# Patient Record
Sex: MALE | Race: WHITE | NOT HISPANIC OR LATINO | Employment: FULL TIME | URBAN - METROPOLITAN AREA
[De-identification: names, ages, dates, MRNs, and addresses within clinical notes are randomized per-mention and may not be internally consistent; named-entity substitution may affect disease eponyms.]

---

## 2017-04-11 ENCOUNTER — ALLSCRIPTS OFFICE VISIT (OUTPATIENT)
Dept: OTHER | Facility: OTHER | Age: 47
End: 2017-04-11

## 2017-10-07 ENCOUNTER — GENERIC CONVERSION - ENCOUNTER (OUTPATIENT)
Dept: OTHER | Facility: OTHER | Age: 47
End: 2017-10-07

## 2017-10-08 ENCOUNTER — GENERIC CONVERSION - ENCOUNTER (OUTPATIENT)
Dept: OTHER | Facility: OTHER | Age: 47
End: 2017-10-08

## 2017-10-08 LAB
A/G RATIO (HISTORICAL): 1.8 (ref 1.2–2.2)
ALBUMIN SERPL BCP-MCNC: 4.4 G/DL (ref 3.5–5.5)
ALP SERPL-CCNC: 37 IU/L (ref 39–117)
ALT SERPL W P-5'-P-CCNC: 26 IU/L (ref 0–44)
AST SERPL W P-5'-P-CCNC: 35 IU/L (ref 0–40)
BILIRUB SERPL-MCNC: 0.6 MG/DL (ref 0–1.2)
BUN SERPL-MCNC: 13 MG/DL (ref 6–24)
BUN/CREA RATIO (HISTORICAL): 14 (ref 9–20)
CALCIUM SERPL-MCNC: 9.4 MG/DL (ref 8.7–10.2)
CHLORIDE SERPL-SCNC: 99 MMOL/L (ref 96–106)
CHOLEST SERPL-MCNC: 196 MG/DL (ref 100–199)
CO2 SERPL-SCNC: 25 MMOL/L (ref 18–29)
CREAT SERPL-MCNC: 0.96 MG/DL (ref 0.76–1.27)
EGFR AFRICAN AMERICAN (HISTORICAL): 110 ML/MIN/1.73
EGFR-AMERICAN CALC (HISTORICAL): 95 ML/MIN/1.73
GLUCOSE SERPL-MCNC: 95 MG/DL (ref 65–99)
HDLC SERPL-MCNC: 36 MG/DL
INTERPRETATION (HISTORICAL): NORMAL
LDLC SERPL CALC-MCNC: 147 MG/DL (ref 0–99)
POTASSIUM SERPL-SCNC: 4.2 MMOL/L (ref 3.5–5.2)
PROSTATE SPECIFIC ANTIGEN (HISTORICAL): 1.2 NG/ML (ref 0–4)
SODIUM SERPL-SCNC: 141 MMOL/L (ref 134–144)
TOT. GLOBULIN, SERUM (HISTORICAL): 2.4 G/DL (ref 1.5–4.5)
TOTAL PROTEIN (HISTORICAL): 6.8 G/DL (ref 6–8.5)
TRIGL SERPL-MCNC: 63 MG/DL (ref 0–149)

## 2017-10-10 ENCOUNTER — ALLSCRIPTS OFFICE VISIT (OUTPATIENT)
Dept: OTHER | Facility: OTHER | Age: 47
End: 2017-10-10

## 2018-01-11 NOTE — RESULT NOTES
Verified Results  (1) COMPREHENSIVE METABOLIC PANEL [id number] 78:04FP Shona Ferrera     Test Name Result Flag Reference   Glucose, Serum 86 mg/dL  65-99   BUN 20 mg/dL  6-24   Creatinine, Serum 0 82 mg/dL  0 76-1 27   eGFR If NonAfricn Am 108 mL/min/1 73  >59   eGFR If Africn Am 124 mL/min/1 73  >59   BUN/Creatinine Ratio 24 H 9-20   Sodium, Serum 141 mmol/L  134-144   Potassium, Serum 4 3 mmol/L  3 5-5 2   Chloride, Serum 100 mmol/L     Carbon Dioxide, Total 22 mmol/L  18-29   Calcium, Serum 9 6 mg/dL  8 7-10 2   Protein, Total, Serum 7 0 g/dL  6 0-8 5   Albumin, Serum 4 8 g/dL  3 5-5 5   Globulin, Total 2 2 g/dL  1 5-4 5   A/G Ratio 2 2  1 1-2 5   Bilirubin, Total 0 3 mg/dL  0 0-1 2   Alkaline Phosphatase, S 45 IU/L     AST (SGOT) 30 IU/L  0-40   ALT (SGPT) 28 IU/L  0-44     (1) LIPID PANEL FASTING W DIRECT LDL REFLEX 09Apr2016 07:22AM Shona Ferrera     Test Name Result Flag Reference   Cholesterol, Total 241 mg/dL H 100-199   Triglycerides 56 mg/dL  0-149   HDL Cholesterol 42 mg/dL  >39   According to ATP-III Guidelines, HDL-C >59 mg/dL is considered a  negative risk factor for CHD  LDL Cholesterol Calc 188 mg/dL H 0-99     (1) TESTOSTERONE 09Apr2016 07:22AM Shona Ferrera     Test Name Result Flag Reference   Testosterone, Serum 381 ng/dL  [phone]   Comment: Comment     Adult male reference interval is based on a population of lean males  up to 36years old  [facility]) Cardiovascular Risk Assessment 09Apr2016 07:22AM Shona Ferrera     Test Name Result Flag Reference   Interpretation Note     Supplement report is available  PDF Image   (1) TSH 09Apr2016 07:22AM Sohna Ferrera     Test Name Result Flag Reference   TSH 0 971 uIU/mL  0 450-4 500       Discussion/Summary   Sugar, kidneys, minerals and liver are normal    LDL bad cholesterol is high  Goal is <130  Medications should be considered if you are willing     Testosterone is normal    Thyroid function is normal     Ramona [place]

## 2018-01-13 VITALS
TEMPERATURE: 98.1 F | BODY MASS INDEX: 28.28 KG/M2 | DIASTOLIC BLOOD PRESSURE: 70 MMHG | RESPIRATION RATE: 16 BRPM | HEART RATE: 92 BPM | HEIGHT: 66 IN | WEIGHT: 176 LBS | SYSTOLIC BLOOD PRESSURE: 138 MMHG

## 2018-01-15 NOTE — PROGRESS NOTES
Assessment    1  Benign essential hypertension (401 1) (I10)   2  History of nicotine dependence (V15 82) (Z87 891)   3  Obesity (278 00) (E66 9)   4  [place] cardiac risk >20% in next 10 years (V49 89) (Z91 89)   5  Encounter for preventive health examination (V70 0) (Z00 00)   6  ED (erectile dysfunction) of organic origin (607 84) (N52 9)   7  Nicotine dependence (305 1) (F17 200)   8  Acute upper respiratory infection (465 9) (J06 9)   9  BMI 28 0-28 9,adult (V85 24) (Z68 28)    Plan  Acute upper respiratory infection    · Zithromax Z-Salvador 250 MG Oral Tablet (Azithromycin); Take as directed  Benign essential hypertension, ED (erectile dysfunction) of organic origin, PMH: History  of nicotine dependence, Hyperlipidemia LDL goal <130    · (1) COMPREHENSIVE METABOLIC PANEL; Status:Active; Requested for:10Oct2017;    · (1) TSH; Status:Active; Requested for:10Oct2017;   ED (erectile dysfunction) of organic origin    · Viagra 100 MG Oral Tablet; as directed  Obesity    · Begin a limited exercise program ; Status:Complete;   Done: 84DDY7491   · Eat a low fat and low cholesterol diet ; Status:Complete;   Done: 46IDY9484   · Some eating tips that can help you lose weight ; Status:Complete;   Done: 11IQB1237   · We recommend that you bring your body mass index down to 26 ; Status:Complete;    Done: 58IXH3303  SocHx: Nicotine dependence    · Follow Up if Not Better Evaluation and Treatment  Follow-up  Status: Complete  Done:  84MIA1301   · Shared Decision Making Aid given; Status:Complete;   Done: 10Oct2017   · You need to quit smoking ; Status:Complete;   Done: 66AOX1864    Discussion/Summary  The patient was counseled regarding instructions for management, risk factor reductions, impressions, risks and benefits of treatment options, importance of compliance with treatment  Possible side effects of new medications were reviewed with the patient/guardian today   The treatment plan was reviewed with the patient/guardian  The patient/guardian understands and agrees with the treatment plan      Chief Complaint  pt present for CPE  ac/cma      History of Present Illness  HM, Adult Male: The patient is being seen for a health maintenance evaluation  General Health: The patient's health since the last visit is described as good   exercises a lot but admits to anabolic steroid use  Immunizations status: up to date  Lifestyle:  He consumes a diverse and healthy diet  He does not have any weight concerns  He exercises regularly  He uses tobacco    Reproductive health:  He complains of erectile dysfunction  Screening:   HPI: loses erection before completion  for a while  interested in meds  already on testosterone    uri sx  few days  cough  mucus  no sob  no hemoptysis      Review of Systems    Constitutional: no fever and no chills  Cardiovascular: no chest pain  Respiratory: no wheezing  Active Problems    1  Allergic rhinitis (477 9) (J30 9)   2  Benign essential hypertension (401 1) (I10)   3  BMI 28 0-28 9,adult (V85 24) (Z68 28)   4  Colon cancer screening (V76 51) (Z12 11)   5  Depression screening (V79 0) (Z13 89)   6  Hyperlipidemia LDL goal <130 (272 4) (E78 5)   7  Immunization due (V05 9) (Z23)   8  Obesity (278 00) (E66 9)   9  Prostate cancer screening (V76 44) (Z12 5)   10  Screening for cardiovascular, respiratory, and genitourinary diseases    (V81 2,V81 4,V81 6) (Z13 6,Z13 83,Z13 89)   11  Screening for diabetes mellitus (DM) (V77 1) (Z13 1)   12   Thyroid disorder screening (V77 0) (Z13 29)    Past Medical History    · History of Acute maxillary sinusitis (461 0) (J01 00)   · History of Acute maxillary sinusitis (461 0) (J01 00)   · History of Acute maxillary sinusitis (461 0) (J01 00)   · History of Acute upper respiratory infection (465 9) (J06 9)   · History of Gastrointestinal symptoms (787 99) (R19 8)   · History of acute bronchitis (V12 69) (Z87 09)   · History of backache (V13 59) (Z87 39)   · History of headache (V13 89) (Z87 898)   · History of low back pain (V13 59) (Z87 39)   · History of nicotine dependence (V15 82) (Z87 891)   · History of vertigo (V12 49) (N07 203)   · History of viral warts (V12 09) (Z86 19)   · History of Knee Sprain (844 9)   · History of Myalgia And Myositis (729 1)   · 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)    Family History  Father    · Family history of hypertension (V17 49) (Z82 49)    Social History    · Cigarette smoker (305 1) (F17 210)   · Nicotine dependence (305 1) (F17 200)    Current Meds   1  AmLODIPine Besylate 5 MG Oral Tablet; Take 1 tablet daily; Therapy: 43TYZ2993 to [phone])  Requested for: 86Eew1324; Last   Rx:52Wkm8949 Ordered   2  Aspir-81 81 MG Oral Tablet Delayed Release; 1 every day; Therapy: 60Wry7496 to (Last Rx:73Ztq1085) Ordered    Allergies    1  No Known Drug Allergies    Vitals   Recorded: 30GUO8392 06:34PM Recorded: 15UIQ4818 06:15PM   Temperature  97 4 F   Heart Rate  82   Respiration  16   Systolic 587 280   Diastolic 76 88   Height  5 ft 6 in   Weight  176 lb    BMI Calculated  28 41   BSA Calculated  1 89     Physical Exam    Constitutional   General appearance: No acute distress, well appearing and well nourished  Eyes   Conjunctiva and lids: No erythema, swelling or discharge  Pupils and irises: Equal, round, reactive to light  Ophthalmoscopic examination: Normal fundi and optic discs  Ears, Nose, Mouth, and Throat   External inspection of ears and nose: Normal     Otoscopic examination: Tympanic membranes translucent with normal light reflex  Canals patent without erythema  Nasal mucosa, septum, and turbinates: Normal without edema or erythema  Lips, teeth, and gums: Normal, good dentition  Oropharynx: Normal with no erythema, edema, exudate or lesions  Neck   Neck: Supple, symmetric, trachea midline, no masses      Pulmonary   Respiratory effort: No increased work of breathing or signs of respiratory distress  Auscultation of lungs: Clear to auscultation  Cardiovascular   Palpation of heart: Normal PMI, no thrills  Auscultation of heart: Normal rate and rhythm, normal S1 and S2, no murmurs  Carotid pulses: 2+ bilaterally  Pedal pulses: 2+ bilaterally  Examination of extremities for edema and/or varicosities: Normal     Chest   Chest: Normal     Abdomen   Abdomen: Non-tender, no masses  Liver and spleen: No hepatomegaly or splenomegaly  Examination for hernias: No hernias appreciated  Genitourinary   Scrotal contents: Normal testes, no masses  Penis: Normal, no lesions  Digital rectal exam of prostate: Normal size, no masses  Lymphatic   Palpation of lymph nodes in neck: No lymphadenopathy  Palpation of lymph nodes in groin: No lymphadenopathy  Musculoskeletal   Gait and station: Normal     Inspection/palpation of digits and nails: Normal without clubbing or cyanosis  Inspection/palpation of joints, bones, and muscles: Normal     Range of motion: Normal     Stability: Normal     Skin   Skin and subcutaneous tissue: Normal without rashes or lesions  Palpation of skin and subcutaneous tissue: Normal turgor  Neurologic   Reflexes: 2+ and symmetric  Sensation: No sensory loss  Psychiatric   Judgment and insight: Normal     Mood and affect: Normal        Results/Data  PHQ-2 Adult Depression Screening 10Oct2017 06:21PM User, [employer]     Test Name Result Flag Reference   PHQ-2 Adult Depression Score 0     Over the last two weeks, how often have you been bothered by any of the following problems? Little interest or pleasure in doing things: Not at all - 0  Feeling down, depressed, or hopeless: Not at all - 0   PHQ-2 Adult Depression Screening Negative         Procedure    Procedure: Visual Acuity Test    Indication: routine screening  Inforrmation supplied by a Snellen chart     Results: 20/20 in both eyes without corrective device, 20/13 in the right eye without corrective device, 20/25 in the left eye without corrective device      Provider Comments  Provider Comments:   uri new, f/u if no better, zpack if worse  htn borderline control, f/u J7I  anabolic steroid risks advised, watch psa and psa velocity  framingham score aware, defers statin  Use of statins for purposes of CVD/CVA risk reduction was advised according to USPSTF guidelines, along with appropriate continued liver monitoring    ED new      Signatures   Electronically signed by : Amos Duke DO; Oct 10 2017  9:11PM EST                       (Author)

## 2018-01-16 NOTE — RESULT NOTES
Verified Results  (1) COMPREHENSIVE METABOLIC PANEL [id number] 00:60FW Nancy Larios     Test Name Result Flag Reference   Glucose, Serum 92 mg/dL  65-99   BUN 17 mg/dL  6-24   Creatinine, Serum 0 78 mg/dL  0 76-1 27   eGFR If NonAfricn Am 110 mL/min/1 73  >59   eGFR If Africn Am 127 mL/min/1 73  >59   BUN/Creatinine Ratio 22 H 9-20   Sodium, Serum 141 mmol/L  136-144   **Please note reference interval change**   Potassium, Serum 4 6 mmol/L  3 5-5 2   **Please note reference interval change**   Chloride, Serum 101 mmol/L     **Please note reference interval change**   Carbon Dioxide, Total 22 mmol/L  18-29   Calcium, Serum 9 3 mg/dL  8 7-10 2   Protein, Total, Serum 6 9 g/dL  6 0-8 5   Albumin, Serum 4 7 g/dL  3 5-5 5   Globulin, Total 2 2 g/dL  1 5-4 5   A/G Ratio 2 1  1 1-2 5   Bilirubin, Total 0 4 mg/dL  0 0-1 2   Alkaline Phosphatase, S 45 IU/L     AST (SGOT) 23 IU/L  0-40   ALT (SGPT) 22 IU/L  0-44     (1) LIPID PANEL FASTING W DIRECT LDL REFLEX 21Oct2016 09:26AM Nancy Larios     Test Name Result Flag Reference   Cholesterol, Total 189 mg/dL  100-199   Triglycerides 67 mg/dL  0-149   HDL Cholesterol 44 mg/dL  >39   According to ATP-III Guidelines, HDL-C >59 mg/dL is considered a  negative risk factor for CHD  LDL Cholesterol Calc 132 mg/dL H 0-99     (1) PSA (SCREEN) (Dx V76 44 Screen for Prostate Cancer) 21Oct2016 09:26AM Nancy [email or web address]     Test Name Result Flag Reference   Prostate Specific Ag, Serum 0 6 ng/mL  0 0-4 0   Roche ECLIA methodology  According to the American Urological Association, Serum PSA should  decrease and remain at undetectable levels after radical  prostatectomy  The AUA defines biochemical recurrence as an initial  PSA value 0 2 ng/mL or greater followed by a subsequent confirmatory  PSA value 0 2 ng/mL or greater  Values obtained with different assay methods or kits cannot be used  interchangeably   Results cannot be interpreted as absolute evidence  of the presence or absence of malignant disease  Discussion/Summary   Sugar, kidneys, minerals and liver are normal       LDL bad cholesterol is slightly elevated  Diet/exercise/weight loss is recommended        Prostate level is normal    Dr Shah

## 2018-01-22 VITALS
DIASTOLIC BLOOD PRESSURE: 76 MMHG | BODY MASS INDEX: 28.28 KG/M2 | WEIGHT: 176 LBS | HEART RATE: 82 BPM | RESPIRATION RATE: 16 BRPM | HEIGHT: 66 IN | SYSTOLIC BLOOD PRESSURE: 142 MMHG | TEMPERATURE: 97.4 F

## 2018-04-16 ENCOUNTER — TELEPHONE (OUTPATIENT)
Dept: FAMILY MEDICINE CLINIC | Facility: CLINIC | Age: 48
End: 2018-04-16

## 2018-04-16 DIAGNOSIS — I10 BENIGN ESSENTIAL HYPERTENSION: Primary | ICD-10-CM

## 2018-04-16 PROBLEM — N52.9 ED (ERECTILE DYSFUNCTION) OF ORGANIC ORIGIN: Status: ACTIVE | Noted: 2017-10-10

## 2018-04-16 RX ORDER — AMLODIPINE BESYLATE 5 MG/1
1 TABLET ORAL DAILY
COMMUNITY
Start: 2011-11-15 | End: 2018-04-16 | Stop reason: SDUPTHER

## 2018-04-16 RX ORDER — ASPIRIN 81 MG/1
TABLET ORAL DAILY
COMMUNITY
Start: 2017-04-11 | End: 2021-10-16

## 2018-04-16 RX ORDER — AMLODIPINE BESYLATE 5 MG/1
5 TABLET ORAL DAILY
Qty: 90 TABLET | Refills: 0 | Status: SHIPPED | OUTPATIENT
Start: 2018-04-16 | End: 2018-05-15 | Stop reason: SDUPTHER

## 2018-04-16 RX ORDER — SILDENAFIL 100 MG/1
TABLET, FILM COATED ORAL
COMMUNITY
Start: 2017-10-10 | End: 2019-05-14

## 2018-04-16 NOTE — TELEPHONE ENCOUNTER
Pt needs refill on amlodipine sent to [facility] in [place]  He has an upcoming appt in May

## 2018-04-29 LAB
ALBUMIN SERPL-MCNC: 4.6 G/DL (ref 3.5–5.5)
ALBUMIN/GLOB SERPL: 1.8 {RATIO} (ref 1.2–2.2)
ALP SERPL-CCNC: 42 IU/L (ref 39–117)
ALT SERPL-CCNC: 31 IU/L (ref 0–44)
AST SERPL-CCNC: 31 IU/L (ref 0–40)
BILIRUB SERPL-MCNC: 0.4 MG/DL (ref 0–1.2)
BUN SERPL-MCNC: 14 MG/DL (ref 6–24)
BUN/CREAT SERPL: 16 (ref 9–20)
CALCIUM SERPL-MCNC: 9.4 MG/DL (ref 8.7–10.2)
CHLORIDE SERPL-SCNC: 99 MMOL/L (ref 96–106)
CO2 SERPL-SCNC: 25 MMOL/L (ref 18–29)
CREAT SERPL-MCNC: 0.87 MG/DL (ref 0.76–1.27)
GLOBULIN SER-MCNC: 2.5 G/DL (ref 1.5–4.5)
GLUCOSE SERPL-MCNC: 92 MG/DL (ref 65–99)
POTASSIUM SERPL-SCNC: 4.6 MMOL/L (ref 3.5–5.2)
PROT SERPL-MCNC: 7.1 G/DL (ref 6–8.5)
SL AMB EGFR AFRICAN AMERICAN: 120 ML/MIN/1.73
SL AMB EGFR NON AFRICAN AMERICAN: 103 ML/MIN/1.73
SODIUM SERPL-SCNC: 141 MMOL/L (ref 134–144)
TSH SERPL DL<=0.005 MIU/L-ACNC: 1.6 UIU/ML (ref 0.45–4.5)

## 2018-05-15 ENCOUNTER — OFFICE VISIT (OUTPATIENT)
Dept: FAMILY MEDICINE CLINIC | Facility: CLINIC | Age: 48
End: 2018-05-15
Payer: COMMERCIAL

## 2018-05-15 VITALS
BODY MASS INDEX: 28.99 KG/M2 | RESPIRATION RATE: 18 BRPM | SYSTOLIC BLOOD PRESSURE: 126 MMHG | HEART RATE: 86 BPM | TEMPERATURE: 97.7 F | DIASTOLIC BLOOD PRESSURE: 68 MMHG | WEIGHT: 179.6 LBS

## 2018-05-15 DIAGNOSIS — Z13.83 SCREENING FOR CARDIOVASCULAR, RESPIRATORY, AND GENITOURINARY DISEASES: ICD-10-CM

## 2018-05-15 DIAGNOSIS — Z72.0 TOBACCO USE: ICD-10-CM

## 2018-05-15 DIAGNOSIS — I10 BENIGN ESSENTIAL HYPERTENSION: Primary | ICD-10-CM

## 2018-05-15 DIAGNOSIS — Z12.5 PROSTATE CANCER SCREENING: ICD-10-CM

## 2018-05-15 DIAGNOSIS — Z13.6 SCREENING FOR CARDIOVASCULAR, RESPIRATORY, AND GENITOURINARY DISEASES: ICD-10-CM

## 2018-05-15 DIAGNOSIS — Z13.89 SCREENING FOR CARDIOVASCULAR, RESPIRATORY, AND GENITOURINARY DISEASES: ICD-10-CM

## 2018-05-15 DIAGNOSIS — Z13.1 SCREENING FOR DIABETES MELLITUS (DM): ICD-10-CM

## 2018-05-15 PROCEDURE — 99213 OFFICE O/P EST LOW 20 MIN: CPT | Performed by: FAMILY MEDICINE

## 2018-05-15 PROCEDURE — 3078F DIAST BP <80 MM HG: CPT | Performed by: FAMILY MEDICINE

## 2018-05-15 PROCEDURE — 3074F SYST BP LT 130 MM HG: CPT | Performed by: FAMILY MEDICINE

## 2018-05-15 RX ORDER — AMLODIPINE BESYLATE 5 MG/1
5 TABLET ORAL DAILY
Qty: 90 TABLET | Refills: 1 | Status: SHIPPED | OUTPATIENT
Start: 2018-05-15 | End: 2018-10-23 | Stop reason: SDUPTHER

## 2018-05-15 NOTE — PROGRESS NOTES
Assessment/Plan:    No problem-specific Assessment & Plan notes found for this encounter       htn stable  tob use discouraged  q6m f/u     Diagnoses and all orders for this visit:    Screening for cardiovascular, respiratory, and genitourinary diseases  -     Lipid Panel with Direct LDL reflex; Future    Screening for diabetes mellitus (DM)  -     Comprehensive metabolic panel; Future    Prostate cancer screening  -     PSA, Total Screen; Future    Benign essential hypertension  -     amLODIPine (NORVASC) 5 mg tablet; Take 1 tablet (5 mg total) by mouth daily    BMI 28 0-28 9,adult    Tobacco use              Return in about 5 months (around 10/15/2018) for Annual physical     Subjective:      Patient ID: Fly Lambert is a 57 y o  male  Chief Complaint   Patient presents with    Follow-up     on lab work doenon 18  af/rma        HPI   Off testosterone  Taking bp meds  No edema  Not as much exercise  Eats healthy  Stress at home  Grandmother   Smokes 1ppd  Bad dreams with chantix in past      The following portions of the patient's history were reviewed and updated as appropriate: allergies, current medications, past family history, past medical history, past social history, past surgical history and problem list     Review of Systems   Respiratory: Negative for shortness of breath  Cardiovascular: Negative for chest pain  Current Outpatient Prescriptions   Medication Sig Dispense Refill    amLODIPine (NORVASC) 5 mg tablet Take 1 tablet (5 mg total) by mouth daily 90 tablet 1    aspirin (ASPIR-81) 81 mg EC tablet Take by mouth daily      sildenafil (VIAGRA) 100 mg tablet Take by mouth       No current facility-administered medications for this visit  Objective:    /68   Pulse 86   Temp 97 7 °F (36 5 °C)   Resp 18   Wt 81 5 kg (179 lb 9 6 oz)   BMI 28 99 kg/m²        Physical Exam   Constitutional: He appears well-developed  HENT:   Head: Normocephalic     Eyes: Conjunctivae are normal    Neck: Neck supple  Cardiovascular: Normal rate and intact distal pulses  Pulmonary/Chest: Effort normal  No respiratory distress  Abdominal: Soft  Musculoskeletal: He exhibits no edema or deformity  Neurological: He is alert  Skin: Skin is warm and dry  Psychiatric: His behavior is normal  Thought content normal    Nursing note and vitals reviewed               Anabella Irwin DO

## 2018-10-23 ENCOUNTER — OFFICE VISIT (OUTPATIENT)
Dept: FAMILY MEDICINE CLINIC | Facility: CLINIC | Age: 48
End: 2018-10-23
Payer: COMMERCIAL

## 2018-10-23 VITALS
WEIGHT: 172 LBS | TEMPERATURE: 98 F | RESPIRATION RATE: 16 BRPM | SYSTOLIC BLOOD PRESSURE: 130 MMHG | DIASTOLIC BLOOD PRESSURE: 80 MMHG | BODY MASS INDEX: 27.64 KG/M2 | HEIGHT: 66 IN | HEART RATE: 84 BPM

## 2018-10-23 DIAGNOSIS — Z00.00 HEALTHCARE MAINTENANCE: Primary | ICD-10-CM

## 2018-10-23 DIAGNOSIS — M54.50 ACUTE RIGHT-SIDED LOW BACK PAIN WITHOUT SCIATICA: ICD-10-CM

## 2018-10-23 DIAGNOSIS — I10 BENIGN ESSENTIAL HYPERTENSION: ICD-10-CM

## 2018-10-23 PROCEDURE — 99396 PREV VISIT EST AGE 40-64: CPT | Performed by: FAMILY MEDICINE

## 2018-10-23 RX ORDER — METHYLPREDNISOLONE 4 MG/1
TABLET ORAL
Qty: 21 TABLET | Refills: 0 | Status: SHIPPED | OUTPATIENT
Start: 2018-10-23 | End: 2018-10-29

## 2018-10-23 RX ORDER — CYCLOBENZAPRINE HCL 10 MG
10 TABLET ORAL
Qty: 30 TABLET | Refills: 0 | Status: SHIPPED | OUTPATIENT
Start: 2018-10-23 | End: 2019-05-14 | Stop reason: ALTCHOICE

## 2018-10-23 RX ORDER — AMLODIPINE BESYLATE 5 MG/1
5 TABLET ORAL DAILY
Qty: 90 TABLET | Refills: 1 | Status: SHIPPED | OUTPATIENT
Start: 2018-10-23 | End: 2019-05-09 | Stop reason: SDUPTHER

## 2018-10-23 NOTE — PROGRESS NOTES
Assessment/Plan:    No problem-specific Assessment & Plan notes found for this encounter  cpe today  htn stable  Right lumbar strain, possible radiculopathy, plans to work, offer comprehensive spine referral when ready  Wait 1w after last prednisone to do labs     Diagnoses and all orders for this visit:    Healthcare maintenance    Benign essential hypertension  -     amLODIPine (NORVASC) 5 mg tablet; Take 1 tablet (5 mg total) by mouth daily    Acute right-sided low back pain without sciatica  -     Methylprednisolone 4 MG TBPK; Use as directed on package  -     cyclobenzaprine (FLEXERIL) 10 mg tablet; Take 1 tablet (10 mg total) by mouth daily at bedtime              Return in about 6 months (around 4/23/2019) for Recheck  Subjective:      Patient ID: Samina Tuttle is a 57 y o  male  Chief Complaint   Patient presents with    Physical Exam       HPI  Not been to gym, shoulder pain in July that improved  Back pain  Right side  Radiates down right thigh  After tying shoe  Been to friend who is a PT, some improvement but then worse again  Has htn  Taking meds  No testosterone  Hx of HNP low back in past    The following portions of the patient's history were reviewed and updated as appropriate: allergies, current medications, past family history, past medical history, past social history, past surgical history and problem list     Review of Systems   Respiratory: Negative for shortness of breath and wheezing  Cardiovascular: Negative for chest pain  Musculoskeletal: Positive for back pain  Neurological: Negative for syncope           Current Outpatient Prescriptions   Medication Sig Dispense Refill    amLODIPine (NORVASC) 5 mg tablet Take 1 tablet (5 mg total) by mouth daily 90 tablet 1    aspirin (ASPIR-81) 81 mg EC tablet Take by mouth daily      sildenafil (VIAGRA) 100 mg tablet Take by mouth      cyclobenzaprine (FLEXERIL) 10 mg tablet Take 1 tablet (10 mg total) by mouth daily at bedtime 30 tablet 0    Methylprednisolone 4 MG TBPK Use as directed on package 21 tablet 0     No current facility-administered medications for this visit  Objective:    /80   Pulse 84   Temp 98 °F (36 7 °C)   Resp 16   Ht 5' 6" (1 676 m)   Wt 78 kg (172 lb)   BMI 27 76 kg/m²        Physical Exam   Constitutional: He appears well-developed  No distress  HENT:   Head: Normocephalic  Mouth/Throat: No oropharyngeal exudate  Eyes: Conjunctivae are normal  No scleral icterus  Neck: Neck supple  Cardiovascular: Normal rate and intact distal pulses  No murmur heard  Pulmonary/Chest: Effort normal  No respiratory distress  Abdominal: Soft  There is no tenderness  There is no rebound and no guarding  Genitourinary: Prostate normal and penis normal  No penile tenderness  Musculoskeletal: He exhibits no edema or deformity  SLR neg b/l   Lymphadenopathy:     He has no cervical adenopathy  Neurological: He is alert  He displays normal reflexes  He exhibits normal muscle tone  Skin: Skin is warm and dry  No rash noted  No pallor  Psychiatric: His behavior is normal  Thought content normal    Nursing note and vitals reviewed               Brittni Cruz DO

## 2019-05-09 DIAGNOSIS — I10 BENIGN ESSENTIAL HYPERTENSION: ICD-10-CM

## 2019-05-09 RX ORDER — AMLODIPINE BESYLATE 5 MG/1
5 TABLET ORAL DAILY
Qty: 90 TABLET | Refills: 0 | Status: SHIPPED | OUTPATIENT
Start: 2019-05-09 | End: 2019-05-14 | Stop reason: SDUPTHER

## 2019-05-10 DIAGNOSIS — I10 BENIGN ESSENTIAL HYPERTENSION: ICD-10-CM

## 2019-05-10 RX ORDER — AMLODIPINE BESYLATE 5 MG/1
5 TABLET ORAL DAILY
Qty: 90 TABLET | Refills: 0 | Status: CANCELLED | OUTPATIENT
Start: 2019-05-10

## 2019-05-14 ENCOUNTER — OFFICE VISIT (OUTPATIENT)
Dept: FAMILY MEDICINE CLINIC | Facility: CLINIC | Age: 49
End: 2019-05-14
Payer: COMMERCIAL

## 2019-05-14 VITALS
RESPIRATION RATE: 16 BRPM | HEART RATE: 88 BPM | HEIGHT: 66 IN | WEIGHT: 181 LBS | SYSTOLIC BLOOD PRESSURE: 126 MMHG | TEMPERATURE: 98.5 F | BODY MASS INDEX: 29.09 KG/M2 | DIASTOLIC BLOOD PRESSURE: 68 MMHG

## 2019-05-14 DIAGNOSIS — E66.9 OBESITY WITH SERIOUS COMORBIDITY, UNSPECIFIED CLASSIFICATION, UNSPECIFIED OBESITY TYPE: ICD-10-CM

## 2019-05-14 DIAGNOSIS — Z13.89 SCREENING FOR CARDIOVASCULAR, RESPIRATORY, AND GENITOURINARY DISEASES: ICD-10-CM

## 2019-05-14 DIAGNOSIS — Z13.6 SCREENING FOR CARDIOVASCULAR, RESPIRATORY, AND GENITOURINARY DISEASES: ICD-10-CM

## 2019-05-14 DIAGNOSIS — Z91.89 FRAMINGHAM CARDIAC RISK 10-20% IN NEXT 10 YEARS: ICD-10-CM

## 2019-05-14 DIAGNOSIS — Z12.5 PROSTATE CANCER SCREENING: ICD-10-CM

## 2019-05-14 DIAGNOSIS — Z72.0 TOBACCO USE: ICD-10-CM

## 2019-05-14 DIAGNOSIS — I10 BENIGN ESSENTIAL HYPERTENSION: Primary | ICD-10-CM

## 2019-05-14 DIAGNOSIS — Z13.1 SCREENING FOR DIABETES MELLITUS (DM): ICD-10-CM

## 2019-05-14 DIAGNOSIS — Z13.83 SCREENING FOR CARDIOVASCULAR, RESPIRATORY, AND GENITOURINARY DISEASES: ICD-10-CM

## 2019-05-14 PROBLEM — M54.50 ACUTE RIGHT-SIDED LOW BACK PAIN WITHOUT SCIATICA: Status: RESOLVED | Noted: 2018-10-23 | Resolved: 2019-05-14

## 2019-05-14 PROCEDURE — 99214 OFFICE O/P EST MOD 30 MIN: CPT | Performed by: FAMILY MEDICINE

## 2019-05-14 PROCEDURE — 3008F BODY MASS INDEX DOCD: CPT | Performed by: FAMILY MEDICINE

## 2019-05-14 PROCEDURE — 3078F DIAST BP <80 MM HG: CPT | Performed by: FAMILY MEDICINE

## 2019-05-14 PROCEDURE — 3074F SYST BP LT 130 MM HG: CPT | Performed by: FAMILY MEDICINE

## 2019-05-14 RX ORDER — AMLODIPINE BESYLATE 5 MG/1
5 TABLET ORAL DAILY
Qty: 90 TABLET | Refills: 1 | Status: SHIPPED | OUTPATIENT
Start: 2019-05-14 | End: 2019-10-29 | Stop reason: SDUPTHER

## 2019-06-23 LAB
ALBUMIN SERPL-MCNC: 4.7 G/DL (ref 3.5–5.5)
ALBUMIN/GLOB SERPL: 2.1 {RATIO} (ref 1.2–2.2)
ALP SERPL-CCNC: 55 IU/L (ref 39–117)
ALT SERPL-CCNC: 28 IU/L (ref 0–44)
AST SERPL-CCNC: 24 IU/L (ref 0–40)
BILIRUB SERPL-MCNC: 0.3 MG/DL (ref 0–1.2)
BUN SERPL-MCNC: 15 MG/DL (ref 6–24)
BUN/CREAT SERPL: 19 (ref 9–20)
CALCIUM SERPL-MCNC: 9.8 MG/DL (ref 8.7–10.2)
CHLORIDE SERPL-SCNC: 105 MMOL/L (ref 96–106)
CHOLEST SERPL-MCNC: 223 MG/DL (ref 100–199)
CO2 SERPL-SCNC: 24 MMOL/L (ref 20–29)
CREAT SERPL-MCNC: 0.79 MG/DL (ref 0.76–1.27)
GLOBULIN SER-MCNC: 2.2 G/DL (ref 1.5–4.5)
GLUCOSE SERPL-MCNC: 91 MG/DL (ref 65–99)
HDLC SERPL-MCNC: 40 MG/DL
LABCORP COMMENT: NORMAL
LDLC SERPL CALC-MCNC: 164 MG/DL (ref 0–99)
MICRODELETION SYND BLD/T FISH: NORMAL
POTASSIUM SERPL-SCNC: 4.5 MMOL/L (ref 3.5–5.2)
PROT SERPL-MCNC: 6.9 G/DL (ref 6–8.5)
PSA SERPL-MCNC: 0.7 NG/ML (ref 0–4)
SL AMB EGFR AFRICAN AMERICAN: 124 ML/MIN/1.73
SL AMB EGFR NON AFRICAN AMERICAN: 107 ML/MIN/1.73
SODIUM SERPL-SCNC: 143 MMOL/L (ref 134–144)
TRIGL SERPL-MCNC: 93 MG/DL (ref 0–149)

## 2019-08-28 NOTE — RESULT NOTES
Verified Results  (1) COMPREHENSIVE METABOLIC PANEL [id number] 96:09UF Jose Valdes     Test Name Result Flag Reference   Glucose, Serum 95 mg/dL  65-99   BUN 13 mg/dL  6-24   Creatinine, Serum 0 96 mg/dL  0 76-1 27   BUN/Creatinine Ratio 14  9-20   Sodium, Serum 141 mmol/L  134-144   Potassium, Serum 4 2 mmol/L  3 5-5 2   Chloride, Serum 99 mmol/L     Carbon Dioxide, Total 25 mmol/L  18-29   Calcium, Serum 9 4 mg/dL  8 7-10 2   Protein, Total, Serum 6 8 g/dL  6 0-8 5   Albumin, Serum 4 4 g/dL  3 5-5 5   Globulin, Total 2 4 g/dL  1 5-4 5   A/G Ratio 1 8  1 2-2 2   Bilirubin, Total 0 6 mg/dL  0 0-1 2   Alkaline Phosphatase, S 37 IU/L L    AST (SGOT) 35 IU/L  0-40   ALT (SGPT) 26 IU/L  0-44   eGFR If NonAfricn Am 95 mL/min/1 73  >59   eGFR If Africn Am 110 mL/min/1 73  >59 No

## 2019-10-29 ENCOUNTER — OFFICE VISIT (OUTPATIENT)
Dept: FAMILY MEDICINE CLINIC | Facility: CLINIC | Age: 49
End: 2019-10-29
Payer: COMMERCIAL

## 2019-10-29 VITALS
DIASTOLIC BLOOD PRESSURE: 78 MMHG | BODY MASS INDEX: 29.32 KG/M2 | SYSTOLIC BLOOD PRESSURE: 130 MMHG | TEMPERATURE: 98.5 F | HEART RATE: 84 BPM | HEIGHT: 66 IN | RESPIRATION RATE: 16 BRPM | WEIGHT: 182.4 LBS

## 2019-10-29 DIAGNOSIS — I10 BENIGN ESSENTIAL HYPERTENSION: ICD-10-CM

## 2019-10-29 DIAGNOSIS — Z12.5 PROSTATE CANCER SCREENING: ICD-10-CM

## 2019-10-29 DIAGNOSIS — Z13.83 SCREENING FOR CARDIOVASCULAR, RESPIRATORY, AND GENITOURINARY DISEASES: ICD-10-CM

## 2019-10-29 DIAGNOSIS — Z13.6 SCREENING FOR CARDIOVASCULAR, RESPIRATORY, AND GENITOURINARY DISEASES: ICD-10-CM

## 2019-10-29 DIAGNOSIS — Z13.1 SCREENING FOR DIABETES MELLITUS (DM): ICD-10-CM

## 2019-10-29 DIAGNOSIS — Z72.0 TOBACCO USE: ICD-10-CM

## 2019-10-29 DIAGNOSIS — Z13.89 SCREENING FOR CARDIOVASCULAR, RESPIRATORY, AND GENITOURINARY DISEASES: ICD-10-CM

## 2019-10-29 DIAGNOSIS — J01.00 ACUTE NON-RECURRENT MAXILLARY SINUSITIS: ICD-10-CM

## 2019-10-29 DIAGNOSIS — Z00.00 HEALTHCARE MAINTENANCE: Primary | ICD-10-CM

## 2019-10-29 PROCEDURE — 99396 PREV VISIT EST AGE 40-64: CPT | Performed by: FAMILY MEDICINE

## 2019-10-29 RX ORDER — AMLODIPINE BESYLATE 5 MG/1
5 TABLET ORAL DAILY
Qty: 90 TABLET | Refills: 1 | Status: SHIPPED | OUTPATIENT
Start: 2019-10-29 | End: 2020-09-19 | Stop reason: SDUPTHER

## 2019-10-29 RX ORDER — AMOXICILLIN AND CLAVULANATE POTASSIUM 875; 125 MG/1; MG/1
1 TABLET, FILM COATED ORAL 2 TIMES DAILY
Qty: 20 TABLET | Refills: 0 | Status: SHIPPED | OUTPATIENT
Start: 2019-10-29 | End: 2019-11-08

## 2019-10-29 RX ORDER — BUPROPION HYDROCHLORIDE 150 MG/1
150 TABLET, EXTENDED RELEASE ORAL 2 TIMES DAILY
Qty: 60 TABLET | Refills: 1 | Status: SHIPPED | OUTPATIENT
Start: 2019-10-29 | End: 2020-10-17

## 2019-10-29 RX ORDER — BUPROPION HYDROCHLORIDE 150 MG/1
TABLET, EXTENDED RELEASE ORAL
Qty: 60 TABLET | Refills: 0 | Status: SHIPPED | OUTPATIENT
Start: 2019-10-29 | End: 2020-10-17

## 2019-10-29 NOTE — PROGRESS NOTES
Assessment/Plan:    No problem-specific Assessment & Plan notes found for this encounter  cpe done  htn stable, f/u q6m  Sinusitis new, abx and mucinex dm  bmi aware  tob use risks aware, nightmares with chantix, zyban protocol and use/risks advised  Knee pain, chronic, suspect arthritis, short course nsaids prn, mobic discussed     Diagnoses and all orders for this visit:    Healthcare maintenance    Benign essential hypertension  -     amLODIPine (NORVASC) 5 mg tablet; Take 1 tablet (5 mg total) by mouth daily    Tobacco use  -     buPROPion (ZYBAN) 150 MG 12 hr tablet; Start 1 pill in am daily for 3 days 12 days before QUIT date, then 1 po bid at least 8 hrs apart thereafter   -     buPROPion (WELLBUTRIN SR) 150 mg 12 hr tablet; Take 1 tablet (150 mg total) by mouth 2 (two) times a day    Acute non-recurrent maxillary sinusitis  -     amoxicillin-clavulanate (AUGMENTIN) 875-125 mg per tablet; Take 1 tablet by mouth 2 (two) times a day for 10 days    Screening for cardiovascular, respiratory, and genitourinary diseases  -     Lipid Panel with Direct LDL reflex; Future    Screening for diabetes mellitus (DM)  -     Comprehensive metabolic panel; Future    Prostate cancer screening  -     PSA, Total Screen; Future        Return in about 8 months (around 6/29/2020) for Recheck  Subjective:      Patient ID: Jordin Cardenas is a 54 y o  male  Chief Complaint   Patient presents with    Physical Exam     prcma       HPI  Taking bp meds  fram score elevated based on last chol panel  Declines statin  Few wks nasal congestion, green dc, sinus pain, no f/c  Wants to quit tob use, chantix not tolerated in past-nightmares    The following portions of the patient's history were reviewed and updated as appropriate: allergies, current medications, past family history, past medical history, past social history, past surgical history and problem list     Review of Systems   Respiratory: Negative for shortness of breath  Cardiovascular: Negative for chest pain  Current Outpatient Medications   Medication Sig Dispense Refill    amLODIPine (NORVASC) 5 mg tablet Take 1 tablet (5 mg total) by mouth daily 90 tablet 1    aspirin (ASPIR-81) 81 mg EC tablet Take by mouth daily      amoxicillin-clavulanate (AUGMENTIN) 875-125 mg per tablet Take 1 tablet by mouth 2 (two) times a day for 10 days 20 tablet 0    buPROPion (WELLBUTRIN SR) 150 mg 12 hr tablet Take 1 tablet (150 mg total) by mouth 2 (two) times a day 60 tablet 1    buPROPion (ZYBAN) 150 MG 12 hr tablet Start 1 pill in am daily for 3 days 12 days before QUIT date, then 1 po bid at least 8 hrs apart thereafter  60 tablet 0     No current facility-administered medications for this visit  Objective:    /78   Pulse 84   Temp 98 5 °F (36 9 °C)   Resp 16   Ht 5' 6" (1 676 m)   Wt 82 7 kg (182 lb 6 4 oz)   BMI 29 44 kg/m²        Physical Exam   Constitutional: He appears well-developed  HENT:   Head: Normocephalic  Right Ear: External ear normal    Left Ear: External ear normal    Mouth/Throat: No oropharyngeal exudate  Sinuses tender to percussion, nasal turbinates visualized and appear red and swollen     Eyes: Conjunctivae are normal  No scleral icterus  Neck: Neck supple  Cardiovascular: Normal rate, regular rhythm and intact distal pulses  Pulmonary/Chest: Effort normal and breath sounds normal  No respiratory distress  He has no wheezes  He has no rales  Abdominal: Soft  Bowel sounds are normal  There is no tenderness  Musculoskeletal: He exhibits no edema or deformity  Neurological: He is alert  He exhibits normal muscle tone  Skin: Skin is warm and dry  No pallor  Psychiatric: His behavior is normal  Thought content normal    Nursing note and vitals reviewed      mild b/l knee crepitus         Oswaldo Neri DO

## 2019-10-29 NOTE — PATIENT INSTRUCTIONS
Over-the-counter products can be useful for your symptoms:                      <<GUAIFENESIN>> is an expectorant that is useful for thick mucus  Often found in Robitussin and Mucinex products  <<DEXTROMETHORPHAN>> is a cough suppressant that works in the brain   to help reduce bothersome cough  Use with caution with other medications that work in the brain  <<ANTI-HISTAMINES>> are useful as allergy medications and to help dry up   bothersome thin secretions  Less sedating options include   Claritin, Zyrtec, Allegra and Xyzal and have generic OTC forms  <<PSEUDOEPHEDRINE and PHENYLEPHRINE>> are stimulant decongestants   that can be used for congestion and sinus pressure  Avoid or use with caution with high blood pressure or heart conditions  <<NASAL SPRAYS>> Steroid nasal sprays (flonase, nasacort) are used for allergies but   can be used for congestion also  Safe for high blood pressure  Afrin is a decongestant that works quickly but for up to 3 days      mucinex DM is an option today

## 2020-01-23 ENCOUNTER — APPOINTMENT (OUTPATIENT)
Dept: RADIOLOGY | Facility: CLINIC | Age: 50
End: 2020-01-23
Payer: COMMERCIAL

## 2020-01-23 ENCOUNTER — OFFICE VISIT (OUTPATIENT)
Dept: OBGYN CLINIC | Facility: CLINIC | Age: 50
End: 2020-01-23
Payer: COMMERCIAL

## 2020-01-23 VITALS
HEART RATE: 100 BPM | HEIGHT: 66 IN | SYSTOLIC BLOOD PRESSURE: 144 MMHG | WEIGHT: 180 LBS | BODY MASS INDEX: 28.93 KG/M2 | DIASTOLIC BLOOD PRESSURE: 91 MMHG

## 2020-01-23 DIAGNOSIS — M25.562 LEFT KNEE PAIN, UNSPECIFIED CHRONICITY: ICD-10-CM

## 2020-01-23 DIAGNOSIS — M17.12 PRIMARY OSTEOARTHRITIS OF LEFT KNEE: Primary | ICD-10-CM

## 2020-01-23 PROCEDURE — 99203 OFFICE O/P NEW LOW 30 MIN: CPT | Performed by: ORTHOPAEDIC SURGERY

## 2020-01-23 PROCEDURE — 73562 X-RAY EXAM OF KNEE 3: CPT

## 2020-01-23 PROCEDURE — 20610 DRAIN/INJ JOINT/BURSA W/O US: CPT | Performed by: ORTHOPAEDIC SURGERY

## 2020-01-23 RX ORDER — DEXAMETHASONE SODIUM PHOSPHATE 100 MG/10ML
40 INJECTION INTRAMUSCULAR; INTRAVENOUS
Status: COMPLETED | OUTPATIENT
Start: 2020-01-23 | End: 2020-01-23

## 2020-01-23 RX ORDER — LIDOCAINE HYDROCHLORIDE 10 MG/ML
4 INJECTION, SOLUTION INFILTRATION; PERINEURAL
Status: COMPLETED | OUTPATIENT
Start: 2020-01-23 | End: 2020-01-23

## 2020-01-23 RX ADMIN — LIDOCAINE HYDROCHLORIDE 4 ML: 10 INJECTION, SOLUTION INFILTRATION; PERINEURAL at 16:21

## 2020-01-23 RX ADMIN — DEXAMETHASONE SODIUM PHOSPHATE 40 MG: 100 INJECTION INTRAMUSCULAR; INTRAVENOUS at 16:21

## 2020-01-23 NOTE — PROGRESS NOTES
Assessment/Plan:  1  Primary osteoarthritis of left knee  XR knee 3 vw left non injury       Magen Hirsch has left-sided knee pain consistent with mild osteoarthritis  This does seem to be flared with his recent activity  We did elect to provide a left knee cortisone injection for him today  He tolerated the injection well and hopefully this will give him significant relief going forward  I did discuss with him briefly possibilities of further treatments which could include cortisone, bracing, therapy or viscosupplementation in the future if his pain persists  He will follow up as needed  Subjective:   Xochilt Anders is a 54 y o  male who presents to the office for evaluation for left-sided knee pain  He denies any injury or trauma and states he has been having increased discomfort over the medial aspect of the left knee  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  He has increased discomfort with walking and seems to radiate up his leg  He denies any mechanical symptoms of locking or catching  He does remember having an injection in his left knee 10-15 years ago which did help  He denies any swelling or effusion  Review of Systems   Constitutional: Negative for chills, fever and unexpected weight change  HENT: Negative for hearing loss, nosebleeds and sore throat  Eyes: Negative for pain, redness and visual disturbance  Respiratory: Negative for cough, shortness of breath and wheezing  Cardiovascular: Negative for chest pain, palpitations and leg swelling  Gastrointestinal: Negative for abdominal pain, nausea and vomiting  Endocrine: Negative for polyphagia and polyuria  Genitourinary: Negative for dysuria and hematuria  Musculoskeletal:        See HPI   Skin: Negative for rash and wound  Neurological: Negative for dizziness, numbness and headaches  Psychiatric/Behavioral: Negative for decreased concentration and suicidal ideas  The patient is not nervous/anxious  Past Medical History:   Diagnosis Date    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    Nicotine dependence 08/17/2009    Vertigo     last [id number]/09/13    Viral warts 08/27/2010       History reviewed  No pertinent surgical history  Family History   Problem Relation Age of Onset    Hypertension Father        Social History     Occupational History    Not on file   Tobacco Use    Smoking status: Current Every Day Smoker     Types: Cigarettes    Smokeless tobacco: Never Used   Substance and Sexual Activity    Alcohol use: Not on file    Drug use: Not on file    Sexual activity: Not on file         Current Outpatient Medications:     amLODIPine (NORVASC) 5 mg tablet, Take 1 tablet (5 mg total) by mouth daily, Disp: 90 tablet, Rfl: 1    aspirin (ASPIR-81) 81 mg EC tablet, Take by mouth daily, Disp: , Rfl:     buPROPion (WELLBUTRIN SR) 150 mg 12 hr tablet, Take 1 tablet (150 mg total) by mouth 2 (two) times a day (Patient not taking: Reported on 1/23/2020), Disp: 60 tablet, Rfl: 1    buPROPion (ZYBAN) 150 MG 12 hr tablet, Start 1 pill in am daily for 3 days 12 days before QUIT date, then 1 po bid at least 8 hrs apart thereafter  (Patient not taking: Reported on 1/23/2020), Disp: 60 tablet, Rfl: 0    No Known Allergies    Objective:  Vitals:    01/23/20 1452   BP: 144/91   Pulse: 100       Left Knee Exam     Tenderness   The patient is experiencing tenderness in the medial joint line  Range of Motion   Extension: normal   Flexion: normal     Tests   Rina:  Medial - negative Lateral - negative  Varus: negative Valgus: negative    Other   Erythema: absent  Sensation: normal  Pulse: present  Swelling: none  Effusion: no effusion present          Observations   Left Knee   Negative for effusion  Physical Exam   Constitutional: He is oriented to person, place, and time  He appears well-developed and well-nourished  HENT:   Head: Normocephalic and atraumatic  Eyes: Pupils are equal, round, and reactive to light  Conjunctivae are normal    Neck: Normal range of motion  Neck supple  Cardiovascular: Normal rate and intact distal pulses  Pulmonary/Chest: Effort normal  No respiratory distress  Musculoskeletal:        Left knee: He exhibits no effusion  As noted in HPI   Neurological: He is alert and oriented to person, place, and time  Skin: Skin is warm and dry  Psychiatric: He has a normal mood and affect  His behavior is normal    Nursing note and vitals reviewed  I have personally reviewed pertinent films in PACS and my interpretation is as follows: Three-view x-rays of the left knee in the office today demonstrate mild medial compartment narrowing consistent with osteoarthritis        Large joint arthrocentesis: L knee  Date/Time: 1/23/2020 4:21 PM  Consent given by: patient  Site marked: site marked  Timeout: Immediately prior to procedure a time out was called to verify the correct patient, procedure, equipment, support staff and site/side marked as required   Supporting Documentation  Indications: pain   Procedure Details  Location: knee - L knee  Preparation: Patient was prepped and draped in the usual sterile fashion  Needle size: 22 G  Ultrasound guidance: no  Approach: anterolateral  Medications administered: 40 mg dexamethasone 100 mg/10 mL; 4 mL lidocaine 1 %    Patient tolerance: patient tolerated the procedure well with no immediate complications  Dressing:  Sterile dressing applied

## 2020-03-10 ENCOUNTER — OFFICE VISIT (OUTPATIENT)
Dept: OBGYN CLINIC | Facility: CLINIC | Age: 50
End: 2020-03-10
Payer: COMMERCIAL

## 2020-03-10 ENCOUNTER — APPOINTMENT (OUTPATIENT)
Dept: RADIOLOGY | Facility: CLINIC | Age: 50
End: 2020-03-10
Payer: COMMERCIAL

## 2020-03-10 VITALS
HEIGHT: 66 IN | SYSTOLIC BLOOD PRESSURE: 147 MMHG | WEIGHT: 187 LBS | HEART RATE: 93 BPM | DIASTOLIC BLOOD PRESSURE: 90 MMHG | BODY MASS INDEX: 30.05 KG/M2

## 2020-03-10 DIAGNOSIS — M25.552 LEFT HIP PAIN: ICD-10-CM

## 2020-03-10 DIAGNOSIS — M25.562 ACUTE PAIN OF LEFT KNEE: Primary | ICD-10-CM

## 2020-03-10 PROCEDURE — 3077F SYST BP >= 140 MM HG: CPT | Performed by: ORTHOPAEDIC SURGERY

## 2020-03-10 PROCEDURE — 3008F BODY MASS INDEX DOCD: CPT | Performed by: ORTHOPAEDIC SURGERY

## 2020-03-10 PROCEDURE — 3080F DIAST BP >= 90 MM HG: CPT | Performed by: ORTHOPAEDIC SURGERY

## 2020-03-10 PROCEDURE — 99213 OFFICE O/P EST LOW 20 MIN: CPT | Performed by: ORTHOPAEDIC SURGERY

## 2020-03-10 PROCEDURE — 73502 X-RAY EXAM HIP UNI 2-3 VIEWS: CPT

## 2020-03-10 NOTE — PROGRESS NOTES
Assessment/Plan:  1  Acute pain of left knee  MRI knee left  wo contrast   2  Left hip pain  XR hip/pelv 2-3 vws left if performed       Megan Section has increasing left knee pain is not responded to conservative measures thus far  He has not responded to cortisone injection, anti-inflammatories or home therapy for 6 weeks  I would like an MRI of his left knee given the lack of significant degenerative changes for further evaluation to rule out a meniscal injury  He does have some discomfort in his left hip but does not have significant degenerative changes or arthritis in the hip  Does have CAM lesion suggestive of impingement which is giving him some discomfort  I will call him with results of the MRI and discuss appropriate follow-up at that time  Subjective:   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 who presents for follow-up for left-sided knee pain  He was last in the office 6 weeks ago with increased discomfort in the left knee  He had mild degenerative changes visible on his x-ray however they were not as severe as his right knee at that time  He was feeling more discomfort in the left knee we did proceed with a cortisone injection the left knee  He has also been taking anti-inflammatory medications and doing a physician prescribed home therapy regimen  He states that his left knee continues to bother him on a daily basis  The cortisone injection did not significantly help  He may have had a day or 2 of relief however the pain seems to have increased  He feels an aching throbbing constant discomfort over the anterior medial aspect of the left knee  It worsens with walking and squatting and working and improves slightly with rest   He denies any effusion  He denies any mechanical symptoms of locking or catching  He also feels radiating pain up into his left thigh  He does have intermittent pain into the thigh and fasciculation which occurs on a frequent basis    He does have a history of lumbar radiculopathy but denies any back pain and denies any numbness or tingling down his leg but he is unsure if this is related  He does have some discomfort radiating into the left hip as well with movement  Review of Systems   Constitutional: Negative for chills, fever and unexpected weight change  HENT: Negative for hearing loss, nosebleeds and sore throat  Eyes: Negative for pain, redness and visual disturbance  Respiratory: Negative for cough, shortness of breath and wheezing  Cardiovascular: Negative for chest pain, palpitations and leg swelling  Gastrointestinal: Negative for abdominal pain, nausea and vomiting  Endocrine: Negative for polyphagia and polyuria  Genitourinary: Negative for dysuria and hematuria  Musculoskeletal:        See HPI   Skin: Negative for rash and wound  Neurological: Negative for dizziness, numbness and headaches  Psychiatric/Behavioral: Negative for decreased concentration and suicidal ideas  The patient is not nervous/anxious  Past Medical History:   Diagnosis Date    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    Nicotine dependence 08/17/2009    Vertigo     last [id number]/09/13    Viral warts 08/27/2010       History reviewed  No pertinent surgical history      Family History   Problem Relation Age of Onset    Hypertension Father        Social History     Occupational History    Not on file   Tobacco Use    Smoking status: Current Every Day Smoker     Types: Cigarettes    Smokeless tobacco: Never Used   Substance and Sexual Activity    Alcohol use: Yes     Comment: social     Drug use: Never    Sexual activity: Not on file         Current Outpatient Medications:     amLODIPine (NORVASC) 5 mg tablet, Take 1 tablet (5 mg total) by mouth daily, Disp: 90 tablet, Rfl: 1    aspirin (ASPIR-81) 81 mg EC tablet, Take by mouth daily, Disp: , Rfl:     buPROPion (WELLBUTRIN SR) 150 mg 12 hr tablet, Take 1 tablet (150 mg total) by mouth 2 (two) times a day (Patient not taking: Reported on 1/23/2020), Disp: 60 tablet, Rfl: 1    buPROPion (ZYBAN) 150 MG 12 hr tablet, Start 1 pill in am daily for 3 days 12 days before QUIT date, then 1 po bid at least 8 hrs apart thereafter  (Patient not taking: Reported on 1/23/2020), Disp: 60 tablet, Rfl: 0    No Known Allergies    Objective:  Vitals:    03/10/20 1731   BP: 147/90   Pulse: 93       Left Knee Exam     Tenderness   The patient is experiencing tenderness in the medial joint line and medial retinaculum  Range of Motion   Extension: normal   Flexion: normal     Tests   Rina:  Medial - positive Lateral - negative  Varus: negative Valgus: positive  Lachman:  Anterior - negative        Other   Erythema: absent  Sensation: normal  Pulse: present  Swelling: none  Effusion: no effusion present    Comments:  Positive Thessaly test          Observations   Left Knee   Negative for effusion  Physical Exam   Constitutional: He is oriented to person, place, and time  He appears well-developed and well-nourished  HENT:   Head: Normocephalic and atraumatic  Eyes: Pupils are equal, round, and reactive to light  Conjunctivae are normal    Neck: Normal range of motion  Neck supple  Cardiovascular: Normal rate and intact distal pulses  Pulmonary/Chest: Effort normal  No respiratory distress  Musculoskeletal:        Left knee: He exhibits no effusion  As noted in HPI   Neurological: He is alert and oriented to person, place, and time  Skin: Skin is warm and dry  Psychiatric: He has a normal mood and affect  His behavior is normal    Nursing note and vitals reviewed  I have personally reviewed pertinent films in PACS and my interpretation is as follows: Three-view x-rays of the left hip in the office today demonstrate bilateral cam deformities with mild degenerative changes    No fracture

## 2020-03-26 ENCOUNTER — TELEPHONE (OUTPATIENT)
Dept: RADIOLOGY | Facility: HOSPITAL | Age: 50
End: 2020-03-26

## 2020-03-26 NOTE — TELEPHONE ENCOUNTER
Due to concern for coronavirus containment the patient was called to discuss the options to reschedule his upcoming knee MRI  Patient elected to reschedule his procedure to a later date (approximately 4 - 6 weeks from now) and was given the appropriate phone numbers to arrange a new appointment time   ([phone])

## 2020-08-02 ENCOUNTER — OFFICE VISIT (OUTPATIENT)
Dept: URGENT CARE | Facility: CLINIC | Age: 50
End: 2020-08-02
Payer: COMMERCIAL

## 2020-08-02 VITALS
WEIGHT: 184 LBS | TEMPERATURE: 98 F | HEART RATE: 100 BPM | SYSTOLIC BLOOD PRESSURE: 138 MMHG | HEIGHT: 66 IN | RESPIRATION RATE: 18 BRPM | DIASTOLIC BLOOD PRESSURE: 82 MMHG | BODY MASS INDEX: 29.57 KG/M2 | OXYGEN SATURATION: 99 %

## 2020-08-02 DIAGNOSIS — J00 ACUTE RHINITIS: Primary | ICD-10-CM

## 2020-08-02 DIAGNOSIS — J02.9 SORE THROAT: ICD-10-CM

## 2020-08-02 LAB — S PYO AG THROAT QL: NEGATIVE

## 2020-08-02 PROCEDURE — 3008F BODY MASS INDEX DOCD: CPT | Performed by: PHYSICIAN ASSISTANT

## 2020-08-02 PROCEDURE — 99213 OFFICE O/P EST LOW 20 MIN: CPT | Performed by: PHYSICIAN ASSISTANT

## 2020-08-02 PROCEDURE — 3075F SYST BP GE 130 - 139MM HG: CPT | Performed by: PHYSICIAN ASSISTANT

## 2020-08-02 PROCEDURE — 3725F SCREEN DEPRESSION PERFORMED: CPT | Performed by: PHYSICIAN ASSISTANT

## 2020-08-02 PROCEDURE — 87880 STREP A ASSAY W/OPTIC: CPT | Performed by: PHYSICIAN ASSISTANT

## 2020-08-02 PROCEDURE — 3079F DIAST BP 80-89 MM HG: CPT | Performed by: PHYSICIAN ASSISTANT

## 2020-08-02 NOTE — PATIENT INSTRUCTIONS
Begin Zyrtec and using Flonase as directed  Apply a warm compress to your neck area for 20-30 minutes at a time, as needed for swelling and discomfort relief  Gently massage this area  You may take Tylenol or continue ibuprofen for discomfort relief  Follow up with family doctor in 3-5 days  Proceed to the ER if symptoms worsen

## 2020-08-02 NOTE — PROGRESS NOTES
[facility]        NAME: Danette Hernandez is a 52 y o  male  : 1970    MRN: [id number]  DATE: 2020  TIME: 2:20 PM    Assessment and Plan   Acute rhinitis [J00]  1  Acute rhinitis     2  Sore throat  POCT rapid strepA     Discussed with patient there are no palpable lymph nodes on examination, however given mucosal edema, rhinorrhea, and serous effusion, it is likely that patient has underlying viral or allergic inflammation causing lymphatic discomfort  Advised warm compress and gentle massage  Resulted negative rapid strep  Follow-up with PCP if symptoms persist   Patient Instructions     Begin Zyrtec and using Flonase as directed  Apply a warm compress to your neck area for 20-30 minutes at a time, as needed for swelling and discomfort relief  Gently massage this area  You may take Tylenol or continue ibuprofen for discomfort relief  Follow up with family doctor in 3-5 days  Proceed to the ER if symptoms worsen  Chief Complaint     Chief Complaint   Patient presents with    Sore Throat     Pt reports of sore throat x 2 weeks  Pt denies any fever or congestion  History of Present Illness     44-year-old male presenting with complaint of sore throat x2 weeks  Reports the pain has been waxing and waning in severity but discomfort is constant  Reports an occasional HA in association of pain  Denies any change with movement  He denies any fevers, chills, sweats, nasal congestion, runny nose, postnasal drip, or ear pain  Treating with ibuprofen with some relief  Denies associated CP, palpitations, SOB, cough, or wheezing  Hx of seasonal allergies but have not been bad in recent years and is thus not treating  No sick cotnacts or recent travel  Review of Systems   Review of Systems   Constitutional: Negative for chills, fatigue and fever  HENT: Positive for sore throat  Negative for congestion, ear pain, postnasal drip and rhinorrhea      Respiratory: Negative for cough, shortness of breath and wheezing  Cardiovascular: Negative for chest pain and palpitations  Gastrointestinal: Negative for abdominal pain, diarrhea, nausea and vomiting  Musculoskeletal: Negative for myalgias  Skin: Negative for rash  Neurological: Positive for headaches  Negative for dizziness  Current Medications       Current Outpatient Medications:     amLODIPine (NORVASC) 5 mg tablet, Take 1 tablet (5 mg total) by mouth daily, Disp: 90 tablet, Rfl: 1    aspirin (ASPIR-81) 81 mg EC tablet, Take by mouth daily, Disp: , Rfl:     buPROPion (WELLBUTRIN SR) 150 mg 12 hr tablet, Take 1 tablet (150 mg total) by mouth 2 (two) times a day (Patient not taking: Reported on 1/23/2020), Disp: 60 tablet, Rfl: 1    buPROPion (ZYBAN) 150 MG 12 hr tablet, Start 1 pill in am daily for 3 days 12 days before QUIT date, then 1 po bid at least 8 hrs apart thereafter  (Patient not taking: Reported on 1/23/2020), Disp: 60 tablet, Rfl: 0    Current Allergies     Allergies as of 08/02/2020    (No Known Allergies)            The following portions of the patient's history were reviewed and updated as appropriate: allergies, current medications, past family history, past medical history, past social history, past surgical history and problem list      Past Medical History:   Diagnosis Date    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    Nicotine dependence 08/17/2009    Vertigo     last [id number]/09/13    Viral warts 08/27/2010       History reviewed  No pertinent surgical history  Family History   Problem Relation Age of Onset    Hypertension Father          Medications have been verified  Objective   /82   Pulse 100   Temp 98 °F (36 7 °C)   Resp 18   Ht 5' 6"   Wt 83 5 kg (184 lb)   SpO2 99%   BMI 29 70 kg/m²      Rapid strep: negative  Physical Exam     Physical Exam   Constitutional: He appears well-developed  He is cooperative  He does not appear ill  No distress     HENT:   Head: Normocephalic and atraumatic  Right Ear: Hearing, tympanic membrane, external ear and ear canal normal    Left Ear: Hearing, external ear and ear canal normal  A middle ear effusion (serous effusion) is present  Nose: Rhinorrhea present  Mouth/Throat: Uvula is midline  Mucous membranes are moist  Mucous membranes are not pale, not dry and not cyanotic  No oral lesions  No uvula swelling  No oropharyngeal exudate or posterior oropharyngeal erythema  Oropharynx is clear  Eyes: Conjunctivae and lids are normal  Right eye exhibits no discharge and no exudate  Left eye exhibits no discharge and no exudate  Neck: Trachea normal and phonation normal  Neck supple  Normal carotid pulses present  No tracheal tenderness present  Carotid bruit is not present  No neck rigidity  No edema and no erythema present  There is tenderness along the anterior cervical chain and submandibular glands at the mandibular angle without palpable adenopathy  Cardiovascular: Normal rate, regular rhythm and normal heart sounds  Exam reveals no distant heart sounds  Pulmonary/Chest: Effort normal and breath sounds normal  No stridor  No respiratory distress  He has no decreased breath sounds  He has no wheezes  He has no rhonchi  He has no rales  Lymphadenopathy:     He has no cervical adenopathy  Right cervical: No superficial cervical, no deep cervical and no posterior cervical adenopathy present  Left cervical: No superficial cervical, no deep cervical and no posterior cervical adenopathy present  Neurological: He is alert  He is not disoriented  No cranial nerve deficit  Coordination and gait normal    Skin: Skin is warm and dry  No rash noted  He is not diaphoretic  No erythema  No pallor  Psychiatric: His behavior is normal  Judgment and thought content normal    Nursing note and vitals reviewed

## 2020-09-19 DIAGNOSIS — I10 BENIGN ESSENTIAL HYPERTENSION: ICD-10-CM

## 2020-09-19 RX ORDER — AMLODIPINE BESYLATE 5 MG/1
5 TABLET ORAL DAILY
Qty: 90 TABLET | Refills: 1 | Status: SHIPPED | OUTPATIENT
Start: 2020-09-19 | End: 2021-03-15

## 2020-09-19 NOTE — TELEPHONE ENCOUNTER
Patients wife called and asked for refill of AMLODIPINE   To [id number]    Patient has enough medication to last until Monday

## 2020-10-17 PROBLEM — J01.00 ACUTE NON-RECURRENT MAXILLARY SINUSITIS: Status: RESOLVED | Noted: 2019-10-29 | Resolved: 2020-10-17

## 2020-10-20 ENCOUNTER — OFFICE VISIT (OUTPATIENT)
Dept: FAMILY MEDICINE CLINIC | Facility: CLINIC | Age: 50
End: 2020-10-20
Payer: COMMERCIAL

## 2020-10-20 VITALS
DIASTOLIC BLOOD PRESSURE: 74 MMHG | HEIGHT: 64 IN | HEART RATE: 93 BPM | OXYGEN SATURATION: 95 % | SYSTOLIC BLOOD PRESSURE: 128 MMHG | BODY MASS INDEX: 31.96 KG/M2 | WEIGHT: 187.2 LBS | RESPIRATION RATE: 18 BRPM | TEMPERATURE: 98.9 F

## 2020-10-20 DIAGNOSIS — Z00.00 HEALTHCARE MAINTENANCE: Primary | ICD-10-CM

## 2020-10-20 DIAGNOSIS — I10 BENIGN ESSENTIAL HYPERTENSION: ICD-10-CM

## 2020-10-20 DIAGNOSIS — Z91.89 FRAMINGHAM CARDIAC RISK 10-20% IN NEXT 10 YEARS: ICD-10-CM

## 2020-10-20 DIAGNOSIS — N52.9 ED (ERECTILE DYSFUNCTION) OF ORGANIC ORIGIN: ICD-10-CM

## 2020-10-20 DIAGNOSIS — Z13.6 SCREENING FOR CARDIOVASCULAR, RESPIRATORY, AND GENITOURINARY DISEASES: ICD-10-CM

## 2020-10-20 DIAGNOSIS — Z13.83 SCREENING FOR CARDIOVASCULAR, RESPIRATORY, AND GENITOURINARY DISEASES: ICD-10-CM

## 2020-10-20 DIAGNOSIS — B07.0 PLANTAR WARTS: ICD-10-CM

## 2020-10-20 DIAGNOSIS — M79.10 MYALGIA: ICD-10-CM

## 2020-10-20 DIAGNOSIS — Z13.1 SCREENING FOR DIABETES MELLITUS (DM): ICD-10-CM

## 2020-10-20 DIAGNOSIS — Z13.89 SCREENING FOR CARDIOVASCULAR, RESPIRATORY, AND GENITOURINARY DISEASES: ICD-10-CM

## 2020-10-20 DIAGNOSIS — Z12.5 PROSTATE CANCER SCREENING: ICD-10-CM

## 2020-10-20 PROCEDURE — 99396 PREV VISIT EST AGE 40-64: CPT | Performed by: FAMILY MEDICINE

## 2020-10-20 PROCEDURE — 3074F SYST BP LT 130 MM HG: CPT | Performed by: FAMILY MEDICINE

## 2020-10-20 PROCEDURE — 3078F DIAST BP <80 MM HG: CPT | Performed by: FAMILY MEDICINE

## 2020-10-20 RX ORDER — SILDENAFIL 100 MG/1
100 TABLET, FILM COATED ORAL DAILY PRN
Qty: 30 TABLET | Refills: 5 | Status: SHIPPED | OUTPATIENT
Start: 2020-10-20 | End: 2022-05-07

## 2020-11-20 ENCOUNTER — TELEMEDICINE (OUTPATIENT)
Dept: FAMILY MEDICINE CLINIC | Facility: CLINIC | Age: 50
End: 2020-11-20
Payer: COMMERCIAL

## 2020-11-20 DIAGNOSIS — Z03.818 ENCOUNTER FOR OBSERVATION FOR SUSPECTED EXPOSURE TO OTHER BIOLOGICAL AGENTS RULED OUT: ICD-10-CM

## 2020-11-20 DIAGNOSIS — Z03.818 ENCOUNTER FOR OBSERVATION FOR SUSPECTED EXPOSURE TO OTHER BIOLOGICAL AGENTS RULED OUT: Primary | ICD-10-CM

## 2020-11-20 DIAGNOSIS — Z72.0 TOBACCO USE: ICD-10-CM

## 2020-11-20 PROCEDURE — 99213 OFFICE O/P EST LOW 20 MIN: CPT | Performed by: FAMILY MEDICINE

## 2020-11-20 PROCEDURE — U0003 INFECTIOUS AGENT DETECTION BY NUCLEIC ACID (DNA OR RNA); SEVERE ACUTE RESPIRATORY SYNDROME CORONAVIRUS 2 (SARS-COV-2) (CORONAVIRUS DISEASE [COVID-19]), AMPLIFIED PROBE TECHNIQUE, MAKING USE OF HIGH THROUGHPUT TECHNOLOGIES AS DESCRIBED BY CMS-2020-01-R: HCPCS | Performed by: FAMILY MEDICINE

## 2020-11-22 LAB — SARS-COV-2 RNA SPEC QL NAA+PROBE: NOT DETECTED

## 2021-01-13 ENCOUNTER — OFFICE VISIT (OUTPATIENT)
Dept: PODIATRY | Facility: CLINIC | Age: 51
End: 2021-01-13
Payer: COMMERCIAL

## 2021-01-13 VITALS
DIASTOLIC BLOOD PRESSURE: 88 MMHG | HEIGHT: 64 IN | SYSTOLIC BLOOD PRESSURE: 129 MMHG | WEIGHT: 187 LBS | BODY MASS INDEX: 31.92 KG/M2 | RESPIRATION RATE: 17 BRPM

## 2021-01-13 DIAGNOSIS — B07.0 PLANTAR WARTS: ICD-10-CM

## 2021-01-13 DIAGNOSIS — M77.41 METATARSALGIA OF BOTH FEET: Primary | ICD-10-CM

## 2021-01-13 DIAGNOSIS — B35.3 TINEA PEDIS OF BOTH FEET: ICD-10-CM

## 2021-01-13 DIAGNOSIS — M77.42 METATARSALGIA OF BOTH FEET: Primary | ICD-10-CM

## 2021-01-13 PROCEDURE — 99202 OFFICE O/P NEW SF 15 MIN: CPT | Performed by: PODIATRIST

## 2021-01-13 RX ORDER — IMIQUIMOD 12.5 MG/.25G
1 CREAM TOPICAL 3 TIMES WEEKLY
Qty: 12 EACH | Refills: 0 | Status: SHIPPED | OUTPATIENT
Start: 2021-01-13 | End: 2021-04-17

## 2021-01-13 NOTE — PROGRESS NOTES
Assessment/Plan:  Metatarsalgia secondary to hyperkeratosis as well as wart formation  Plan  Foot exam performed  Patient educated on condition  He declines Cantharone treatment  Declines orthotics  We will try Aldara cream   He may need surgical excision of lesions  In addition we will add topical antifungal         Diagnoses and all orders for this visit:    Metatarsalgia of both feet    Plantar warts  -     imiquimod (ALDARA) 5 % cream; Apply 1 packet topically 3 (three) times a week    Tinea pedis of both feet  -     ciclopirox (LOPROX) 0 77 % cream; Apply topically 2 (two) times a day for 20 days          Subjective:  Patient has ongoing pain in the ball of his feet  He gets painful thick skin on his feet  He has history of warts  He believes he has warts now  He is thinking about getting him cut out      No Known Allergies      Current Outpatient Medications:     amLODIPine (NORVASC) 5 mg tablet, Take 1 tablet (5 mg total) by mouth daily, Disp: 90 tablet, Rfl: 1    aspirin (ASPIR-81) 81 mg EC tablet, Take by mouth daily, Disp: , Rfl:     ciclopirox (LOPROX) 0 77 % cream, Apply topically 2 (two) times a day for 20 days, Disp: 45 g, Rfl: 2    imiquimod (ALDARA) 5 % cream, Apply 1 packet topically 3 (three) times a week, Disp: 12 each, Rfl: 0    sildenafil (VIAGRA) 100 mg tablet, Take 1 tablet (100 mg total) by mouth daily as needed for erectile dysfunction, Disp: 30 tablet, Rfl: 5    Patient Active Problem List   Diagnosis    Allergic rhinitis    Benign essential hypertension    ED (erectile dysfunction) of organic origin    Hyperlipidemia LDL goal <130    Obesity    Screening for cardiovascular, respiratory, and genitourinary diseases    Screening for diabetes mellitus (DM)    Prostate cancer screening    Tobacco use    Healthcare maintenance    [place] cardiac risk 10-20% in next 10 years    Myalgia    Plantar warts    Encounter for observation for suspected exposure to other biological agents ruled out          Patient ID: Janee Blanco is a 52 y o  male  HPI    The following portions of the patient's history were reviewed and updated as appropriate:     family history includes Hypertension in his father  reports that he has been smoking cigarettes  He has never used smokeless tobacco  He reports current alcohol use  He reports that he does not use drugs  Vitals:    01/13/21 1510   BP: 129/88   Resp: 17       Review of Systems      Objective:  Patient's shoes and socks removed  Foot Exam    General  General Appearance: appears stated age and healthy   Orientation: alert and oriented to person, place, and time   Affect: appropriate   Gait: antalgic       Right Foot/Ankle     Inspection and Palpation  Ecchymosis: none  Tenderness: bony tenderness and metatarsals   Swelling: dorsum   Arch: pes planus  Hammertoes: fifth toe    Neurovascular  Dorsalis pedis: 3+  Posterior tibial: 3+  Saphenous nerve sensation: normal  Tibial nerve sensation: normal  Superficial peroneal nerve sensation: normal  Deep peroneal nerve sensation: normal  Sural nerve sensation: normal      Left Foot/Ankle      Inspection and Palpation  Ecchymosis: none  Tenderness: bony tenderness and metatarsals   Swelling: dorsum   Arch: pes planus  Hammertoes: fifth toe    Neurovascular  Dorsalis pedis: 3+  Posterior tibial: 3+  Saphenous nerve sensation: normal  Tibial nerve sensation: normal  Superficial peroneal nerve sensation: normal  Deep peroneal nerve sensation: normal  Sural nerve sensation: normal        Physical Exam  Vitals signs and nursing note reviewed  Constitutional:       Appearance: Normal appearance  Cardiovascular:      Pulses:           Dorsalis pedis pulses are 3+ on the right side and 3+ on the left side  Posterior tibial pulses are 3+ on the right side and 3+ on the left side  Musculoskeletal:      Right foot: Bony tenderness present  Left foot: Bony tenderness present  Skin:     Comments: Patient has moccasin tinea pedis bilateral     Patient has callus submetatarsal 5 bilateral   Contained within the area are small punctate lesions consistent with atypical verruca   Neurological:      Mental Status: He is alert  Psychiatric:         Mood and Affect: Mood normal          Behavior: Behavior normal          Thought Content:  Thought content normal          Judgment: Judgment normal

## 2021-01-14 LAB
ALBUMIN SERPL-MCNC: 4.6 G/DL (ref 4–5)
ALBUMIN/GLOB SERPL: 1.9 {RATIO} (ref 1.2–2.2)
ALP SERPL-CCNC: 60 IU/L (ref 39–117)
ALT SERPL-CCNC: 35 IU/L (ref 0–44)
AST SERPL-CCNC: 23 IU/L (ref 0–40)
B BURGDOR IGG+IGM SER-ACNC: <0.91 ISR (ref 0–0.9)
B BURGDOR IGM SER IA-ACNC: <0.8 INDEX (ref 0–0.79)
BASOPHILS # BLD AUTO: 0.1 X10E3/UL (ref 0–0.2)
BASOPHILS NFR BLD AUTO: 1 %
BILIRUB SERPL-MCNC: 0.3 MG/DL (ref 0–1.2)
BUN SERPL-MCNC: 15 MG/DL (ref 6–24)
BUN/CREAT SERPL: 18 (ref 9–20)
CALCIUM SERPL-MCNC: 9.8 MG/DL (ref 8.7–10.2)
CHLORIDE SERPL-SCNC: 101 MMOL/L (ref 96–106)
CHOLEST SERPL-MCNC: 216 MG/DL (ref 100–199)
CK SERPL-CCNC: 194 U/L (ref 49–439)
CO2 SERPL-SCNC: 25 MMOL/L (ref 20–29)
CREAT SERPL-MCNC: 0.83 MG/DL (ref 0.76–1.27)
EOSINOPHIL # BLD AUTO: 0.5 X10E3/UL (ref 0–0.4)
EOSINOPHIL NFR BLD AUTO: 5 %
ERYTHROCYTE [DISTWIDTH] IN BLOOD BY AUTOMATED COUNT: 13.2 % (ref 11.6–15.4)
GLOBULIN SER-MCNC: 2.4 G/DL (ref 1.5–4.5)
GLUCOSE SERPL-MCNC: 88 MG/DL (ref 65–99)
HCT VFR BLD AUTO: 43.8 % (ref 37.5–51)
HDLC SERPL-MCNC: 41 MG/DL
HGB BLD-MCNC: 14.7 G/DL (ref 13–17.7)
IMM GRANULOCYTES # BLD: 0.1 X10E3/UL (ref 0–0.1)
IMM GRANULOCYTES NFR BLD: 1 %
LDLC SERPL CALC-MCNC: 150 MG/DL (ref 0–99)
LYMPHOCYTES # BLD AUTO: 3 X10E3/UL (ref 0.7–3.1)
LYMPHOCYTES NFR BLD AUTO: 34 %
MAGNESIUM SERPL-MCNC: 1.9 MG/DL (ref 1.6–2.3)
MCH RBC QN AUTO: 30.8 PG (ref 26.6–33)
MCHC RBC AUTO-ENTMCNC: 33.6 G/DL (ref 31.5–35.7)
MCV RBC AUTO: 92 FL (ref 79–97)
MICRODELETION SYND BLD/T FISH: NORMAL
MONOCYTES # BLD AUTO: 0.9 X10E3/UL (ref 0.1–0.9)
MONOCYTES NFR BLD AUTO: 10 %
NEUTROPHILS # BLD AUTO: 4.3 X10E3/UL (ref 1.4–7)
NEUTROPHILS NFR BLD AUTO: 49 %
PLATELET # BLD AUTO: 255 X10E3/UL (ref 150–450)
POTASSIUM SERPL-SCNC: 4.7 MMOL/L (ref 3.5–5.2)
PROT SERPL-MCNC: 7 G/DL (ref 6–8.5)
PSA SERPL-MCNC: 0.7 NG/ML (ref 0–4)
RBC # BLD AUTO: 4.78 X10E6/UL (ref 4.14–5.8)
SL AMB EGFR AFRICAN AMERICAN: 120 ML/MIN/1.73
SL AMB EGFR NON AFRICAN AMERICAN: 104 ML/MIN/1.73
SODIUM SERPL-SCNC: 139 MMOL/L (ref 134–144)
TRIGL SERPL-MCNC: 136 MG/DL (ref 0–149)
TSH SERPL DL<=0.005 MIU/L-ACNC: 1.58 UIU/ML (ref 0.45–4.5)
WBC # BLD AUTO: 8.7 X10E3/UL (ref 3.4–10.8)

## 2021-01-29 ENCOUNTER — PROCEDURE VISIT (OUTPATIENT)
Dept: PODIATRY | Facility: CLINIC | Age: 51
End: 2021-01-29
Payer: COMMERCIAL

## 2021-01-29 VITALS
HEART RATE: 93 BPM | BODY MASS INDEX: 31.92 KG/M2 | HEIGHT: 64 IN | WEIGHT: 187 LBS | SYSTOLIC BLOOD PRESSURE: 129 MMHG | RESPIRATION RATE: 17 BRPM | DIASTOLIC BLOOD PRESSURE: 88 MMHG

## 2021-01-29 DIAGNOSIS — M79.672 LEFT FOOT PAIN: ICD-10-CM

## 2021-01-29 DIAGNOSIS — B07.0 PLANTAR WARTS: Primary | ICD-10-CM

## 2021-01-29 PROCEDURE — 11422 EXC H-F-NK-SP B9+MARG 1.1-2: CPT | Performed by: PODIATRIST

## 2021-01-29 RX ORDER — OXYCODONE HYDROCHLORIDE AND ACETAMINOPHEN 5; 325 MG/1; MG/1
1 TABLET ORAL EVERY 8 HOURS PRN
Qty: 15 TABLET | Refills: 0 | Status: SHIPPED | OUTPATIENT
Start: 2021-01-29 | End: 2021-02-03

## 2021-01-29 RX ORDER — LEVOFLOXACIN 500 MG/1
500 TABLET, FILM COATED ORAL EVERY 24 HOURS
Qty: 7 TABLET | Refills: 0 | Status: SHIPPED | OUTPATIENT
Start: 2021-01-29 | End: 2021-02-05

## 2021-01-29 NOTE — PROGRESS NOTES
General  General Appearance: appears stated age and healthy   Orientation: alert and oriented to person, place, and time   Affect: appropriate   Gait: antalgic         Right Foot/Ankle      Inspection and Palpation  Ecchymosis: none  Tenderness: bony tenderness and metatarsals   Swelling: dorsum   Arch: pes planus  Hammertoes: fifth toe     Neurovascular  Dorsalis pedis: 3+  Posterior tibial: 3+  Saphenous nerve sensation: normal  Tibial nerve sensation: normal  Superficial peroneal nerve sensation: normal  Deep peroneal nerve sensation: normal  Sural nerve sensation: normal        Left Foot/Ankle       Inspection and Palpation  Ecchymosis: none  Tenderness: bony tenderness and metatarsals   Swelling: dorsum   Arch: pes planus  Hammertoes: fifth toe     Neurovascular  Dorsalis pedis: 3+  Posterior tibial: 3+  Saphenous nerve sensation: normal  Tibial nerve sensation: normal  Superficial peroneal nerve sensation: normal  Deep peroneal nerve sensation: normal  Sural nerve sensation: normal           Physical Exam  Vitals signs and nursing note reviewed  Constitutional:       Appearance: Normal appearance  Cardiovascular:      Pulses:           Dorsalis pedis pulses are 3+ on the right side and 3+ on the left side  Posterior tibial pulses are 3+ on the right side and 3+ on the left side  Musculoskeletal:      Right foot: Bony tenderness present  Left foot: Bony tenderness present  Skin:     Comments: Patient has moccasin tinea pedis bilateral     Patient has callus submetatarsal 5 bilateral   Contained within the area are small punctate lesions consistent with atypical verruca   Neurological:      Mental Status: He is alert  Psychiatric:         Mood and Affect: Mood normal          Behavior: Behavior normal          Thought Content: Thought content normal          Judgment: Judgment normal         plan  Excision of lesion  Danny Singleton is suspected    After consent form signed and prepping and draping in the usual sterile manner, excision to be done  Anesthesia was achieved with 6 cc of 1% lidocaine with epinephrine  This was done satisfactorily  Without pain or complication  Lesion was addressed at this time  Using a curette, a 1 2 cm lesion was excised on the plantar aspect of the foot  This was submetatarsal 5 of the left foot  At this point it appears that all abnormal tissue removed however sharp and blunt debridement was done to remove any extra tissue  Phenol applied to the base  Base read debrided  Phenol then reapplied  In addition, silver nitrate used to cauterize the area  Gentian violet dry sterile dressing applied  Patient has been prescribed both Levaquin and Percocet  He has been given written instructions    He will follow-up in 2 weeks

## 2021-02-16 ENCOUNTER — OFFICE VISIT (OUTPATIENT)
Dept: PODIATRY | Facility: CLINIC | Age: 51
End: 2021-02-16
Payer: COMMERCIAL

## 2021-02-16 VITALS
HEIGHT: 64 IN | HEART RATE: 93 BPM | RESPIRATION RATE: 17 BRPM | DIASTOLIC BLOOD PRESSURE: 88 MMHG | WEIGHT: 187 LBS | BODY MASS INDEX: 31.92 KG/M2 | SYSTOLIC BLOOD PRESSURE: 129 MMHG

## 2021-02-16 DIAGNOSIS — M79.672 LEFT FOOT PAIN: Primary | ICD-10-CM

## 2021-02-16 DIAGNOSIS — B35.1 ONYCHOMYCOSIS: ICD-10-CM

## 2021-02-16 DIAGNOSIS — S91.302A OPEN WOUND OF LEFT FOOT, INITIAL ENCOUNTER: ICD-10-CM

## 2021-02-16 PROCEDURE — 99213 OFFICE O/P EST LOW 20 MIN: CPT | Performed by: PODIATRIST

## 2021-02-16 RX ORDER — TERBINAFINE HYDROCHLORIDE 250 MG/1
250 TABLET ORAL DAILY
Qty: 30 TABLET | Refills: 0 | Status: SHIPPED | OUTPATIENT
Start: 2021-02-16 | End: 2021-03-18

## 2021-02-16 NOTE — PROGRESS NOTES
Assessment/Plan: pain upon ambulation  Left foot pain  Open wound of left foot  Mycosis of hallux nail bilateral     plan  Foot exam performed  Patient educated on condition  Wounds of left foot debrided  Gentian violet dry sterile dressing applied  Patient watch for signs of infection  He will be placed on oral terbinafine  He will take biotin daily         Diagnoses and all orders for this visit:    Left foot pain    Open wound of left foot, initial encounter    Onychomycosis  -     terbinafine (LamISIL) 250 mg tablet; Take 1 tablet (250 mg total) by mouth daily          Subjective:  Patient has pain after procedure  However he is better now  His biggest concern now is discoloration of his toenails      No Known Allergies      Current Outpatient Medications:     amLODIPine (NORVASC) 5 mg tablet, Take 1 tablet (5 mg total) by mouth daily, Disp: 90 tablet, Rfl: 1    aspirin (ASPIR-81) 81 mg EC tablet, Take by mouth daily, Disp: , Rfl:     ciclopirox (LOPROX) 0 77 % cream, Apply topically 2 (two) times a day for 20 days, Disp: 45 g, Rfl: 2    imiquimod (ALDARA) 5 % cream, Apply 1 packet topically 3 (three) times a week, Disp: 12 each, Rfl: 0    sildenafil (VIAGRA) 100 mg tablet, Take 1 tablet (100 mg total) by mouth daily as needed for erectile dysfunction, Disp: 30 tablet, Rfl: 5    terbinafine (LamISIL) 250 mg tablet, Take 1 tablet (250 mg total) by mouth daily, Disp: 30 tablet, Rfl: 0    Patient Active Problem List   Diagnosis    Allergic rhinitis    Benign essential hypertension    ED (erectile dysfunction) of organic origin    Hyperlipidemia LDL goal <130    Obesity    Screening for cardiovascular, respiratory, and genitourinary diseases    Screening for diabetes mellitus (DM)    Prostate cancer screening    Tobacco use    Healthcare maintenance    [place] cardiac risk 10-20% in next 10 years    Myalgia    Plantar warts    Encounter for observation for suspected exposure to other biological agents ruled out          Patient ID: Alla Duran is a 52 y o  male  HPI    The following portions of the patient's history were reviewed and updated as appropriate:     family history includes Hypertension in his father  reports that he has been smoking cigarettes  He has never used smokeless tobacco  He reports current alcohol use  He reports that he does not use drugs  Vitals:    02/16/21 0914   BP: 129/88   Pulse: 93   Resp: 17       Review of Systems      Objective:  Patient's shoes and socks removed  Foot Exam    General  General Appearance: appears stated age and healthy   Orientation: alert and oriented to person, place, and time   Affect: appropriate   Gait: antalgic       Right Foot/Ankle     Inspection and Palpation  Swelling: dorsum   Arch: pes planus    Neurovascular  Dorsalis pedis: 3+  Posterior tibial: 3+  Saphenous nerve sensation: normal  Tibial nerve sensation: normal  Superficial peroneal nerve sensation: normal  Deep peroneal nerve sensation: normal  Sural nerve sensation: normal      Left Foot/Ankle      Inspection and Palpation  Swelling: dorsum   Arch: pes planus    Neurovascular  Dorsalis pedis: 3+  Posterior tibial: 3+  Saphenous nerve sensation: normal  Tibial nerve sensation: normal  Superficial peroneal nerve sensation: normal  Deep peroneal nerve sensation: normal  Sural nerve sensation: normal        Physical Exam  Vitals signs and nursing note reviewed  Constitutional:       Appearance: Normal appearance  Cardiovascular:      Pulses:           Dorsalis pedis pulses are 3+ on the right side and 3+ on the left side  Posterior tibial pulses are 3+ on the right side and 3+ on the left side  Skin:     Comments:   Hallux bilateral demonstrates severe dystrophy mycosis of nail  There were longitudinal striations and cracking of the nail  Plantar aspect left foot demonstrates 0 5 centimeter squared superficial ulcer    No evidence of verruca or cellulitis   Neurological:      Mental Status: He is alert  Psychiatric:         Mood and Affect: Mood normal          Behavior: Behavior normal          Thought Content:  Thought content normal          Judgment: Judgment normal

## 2021-03-14 DIAGNOSIS — I10 BENIGN ESSENTIAL HYPERTENSION: ICD-10-CM

## 2021-03-15 RX ORDER — AMLODIPINE BESYLATE 5 MG/1
TABLET ORAL
Qty: 90 TABLET | Refills: 1 | Status: SHIPPED | OUTPATIENT
Start: 2021-03-15 | End: 2021-04-20 | Stop reason: SDUPTHER

## 2021-03-25 ENCOUNTER — OFFICE VISIT (OUTPATIENT)
Dept: PODIATRY | Facility: CLINIC | Age: 51
End: 2021-03-25
Payer: COMMERCIAL

## 2021-03-25 VITALS
DIASTOLIC BLOOD PRESSURE: 88 MMHG | WEIGHT: 187 LBS | RESPIRATION RATE: 17 BRPM | HEART RATE: 93 BPM | BODY MASS INDEX: 31.92 KG/M2 | HEIGHT: 64 IN | SYSTOLIC BLOOD PRESSURE: 129 MMHG

## 2021-03-25 DIAGNOSIS — M79.672 LEFT FOOT PAIN: ICD-10-CM

## 2021-03-25 DIAGNOSIS — B07.0 PLANTAR WARTS: Primary | ICD-10-CM

## 2021-03-25 PROCEDURE — 17110 DESTRUCTION B9 LES UP TO 14: CPT | Performed by: PODIATRIST

## 2021-03-25 RX ORDER — IMIQUIMOD 12.5 MG/.25G
1 CREAM TOPICAL 3 TIMES WEEKLY
Qty: 12 EACH | Refills: 0 | Status: SHIPPED | OUTPATIENT
Start: 2021-03-26 | End: 2021-04-20

## 2021-03-25 NOTE — PROGRESS NOTES
Assessment/Plan:   recurring painful verruca left foot  Pain upon ambulation  Plan  Foot exam performed  Lesion debrided  Cantharone applied  Aldara ordered  Diagnoses and all orders for this visit:    Plantar warts  -     imiquimod (ALDARA) 5 % cream; Apply 1 packet topically 3 (three) times a week    Left foot pain          Subjective:   Patient return of painful lesion of left foot  He thinks is a wart    No Known Allergies      Current Outpatient Medications:     amLODIPine (NORVASC) 5 mg tablet, TAKE ONE TABLET BY MOUTH EVERY DAY, Disp: 90 tablet, Rfl: 1    aspirin (ASPIR-81) 81 mg EC tablet, Take by mouth daily, Disp: , Rfl:     ciclopirox (LOPROX) 0 77 % cream, Apply topically 2 (two) times a day for 20 days, Disp: 45 g, Rfl: 2    imiquimod (ALDARA) 5 % cream, Apply 1 packet topically 3 (three) times a week, Disp: 12 each, Rfl: 0    [START ON 3/26/2021] imiquimod (ALDARA) 5 % cream, Apply 1 packet topically 3 (three) times a week, Disp: 12 each, Rfl: 0    sildenafil (VIAGRA) 100 mg tablet, Take 1 tablet (100 mg total) by mouth daily as needed for erectile dysfunction, Disp: 30 tablet, Rfl: 5    Patient Active Problem List   Diagnosis    Allergic rhinitis    Benign essential hypertension    ED (erectile dysfunction) of organic origin    Hyperlipidemia LDL goal <130    Obesity    Screening for cardiovascular, respiratory, and genitourinary diseases    Screening for diabetes mellitus (DM)    Prostate cancer screening    Tobacco use    Healthcare maintenance    [place] cardiac risk 10-20% in next 10 years    Myalgia    Plantar warts    Encounter for observation for suspected exposure to other biological agents ruled out          Patient ID: Baylee Easton is a 54 y o  male  HPI    The following portions of the patient's history were reviewed and updated as appropriate:     family history includes Hypertension in his father        reports that he has been smoking cigarettes  He has never used smokeless tobacco  He reports current alcohol use  He reports that he does not use drugs  Vitals:    03/25/21 1403   BP: 129/88   Pulse: 93   Resp: 17       Review of Systems      Objective:  Patient's shoes and socks removed  Foot ExamPhysical Exam  Vitals signs and nursing note reviewed  Skin:     Capillary Refill: Capillary refill takes less than 2 seconds  Comments:  Plantar lateral aspect of left foot demonstrates small 0 5 centimeters squared skin lesion  Rule out cicatrix versus verruca

## 2021-04-01 ENCOUNTER — OFFICE VISIT (OUTPATIENT)
Dept: PODIATRY | Facility: CLINIC | Age: 51
End: 2021-04-01
Payer: COMMERCIAL

## 2021-04-01 VITALS
SYSTOLIC BLOOD PRESSURE: 129 MMHG | HEART RATE: 93 BPM | WEIGHT: 187 LBS | DIASTOLIC BLOOD PRESSURE: 88 MMHG | HEIGHT: 64 IN | RESPIRATION RATE: 17 BRPM | BODY MASS INDEX: 31.92 KG/M2

## 2021-04-01 DIAGNOSIS — S91.302A OPEN WOUND OF LEFT FOOT, INITIAL ENCOUNTER: Primary | ICD-10-CM

## 2021-04-01 DIAGNOSIS — M79.672 LEFT FOOT PAIN: ICD-10-CM

## 2021-04-01 PROCEDURE — 99213 OFFICE O/P EST LOW 20 MIN: CPT | Performed by: PODIATRIST

## 2021-04-01 NOTE — PROGRESS NOTES
Assessment/Plan: open wound of foot, left  Resolved verruca  Plan  Wound debrided  Gentian violet and silver nitrate applied  Patient will bandage daily for 3 days  Diagnoses and all orders for this visit:    Open wound of left foot, initial encounter    Left foot pain          Subjective:   Patient presents for evaluation of left flatfoot  He had pain after treatment  However he is doing well now  No Known Allergies      Current Outpatient Medications:     amLODIPine (NORVASC) 5 mg tablet, TAKE ONE TABLET BY MOUTH EVERY DAY, Disp: 90 tablet, Rfl: 1    aspirin (ASPIR-81) 81 mg EC tablet, Take by mouth daily, Disp: , Rfl:     ciclopirox (LOPROX) 0 77 % cream, Apply topically 2 (two) times a day for 20 days, Disp: 45 g, Rfl: 2    imiquimod (ALDARA) 5 % cream, Apply 1 packet topically 3 (three) times a week, Disp: 12 each, Rfl: 0    imiquimod (ALDARA) 5 % cream, Apply 1 packet topically 3 (three) times a week, Disp: 12 each, Rfl: 0    sildenafil (VIAGRA) 100 mg tablet, Take 1 tablet (100 mg total) by mouth daily as needed for erectile dysfunction, Disp: 30 tablet, Rfl: 5    Patient Active Problem List   Diagnosis    Allergic rhinitis    Benign essential hypertension    ED (erectile dysfunction) of organic origin    Hyperlipidemia LDL goal <130    Obesity    Screening for cardiovascular, respiratory, and genitourinary diseases    Screening for diabetes mellitus (DM)    Prostate cancer screening    Tobacco use    Healthcare maintenance    [place] cardiac risk 10-20% in next 10 years    Myalgia    Plantar warts    Encounter for observation for suspected exposure to other biological agents ruled out          Patient ID: Alessandra Scruggs is a 54 y o  male  HPI    The following portions of the patient's history were reviewed and updated as appropriate:     family history includes Hypertension in his father  reports that he has been smoking cigarettes   He has never used smokeless tobacco  He reports current alcohol use  He reports that he does not use drugs  Vitals:    04/01/21 1531   BP: 129/88   Pulse: 93   Resp: 17       Review of Systems      Objective:  Patient's shoes and socks removed  Foot ExamPhysical Exam        Skin:     Capillary Refill: Capillary refill takes less than 2 seconds  Comments:  Plantar lateral aspect of left foot demonstrates small 0 5 centimeters squared skin lesion  Rule out cicatrix versus verruca  The lesion is now contained within a bullae  This has been debrided  Superficial ulcer noted    No evidence of infection

## 2021-04-17 PROBLEM — Z03.818 ENCOUNTER FOR OBSERVATION FOR SUSPECTED EXPOSURE TO OTHER BIOLOGICAL AGENTS RULED OUT: Status: RESOLVED | Noted: 2020-11-20 | Resolved: 2021-04-17

## 2021-04-20 ENCOUNTER — OFFICE VISIT (OUTPATIENT)
Dept: FAMILY MEDICINE CLINIC | Facility: CLINIC | Age: 51
End: 2021-04-20
Payer: COMMERCIAL

## 2021-04-20 VITALS
HEART RATE: 121 BPM | BODY MASS INDEX: 33.8 KG/M2 | DIASTOLIC BLOOD PRESSURE: 80 MMHG | OXYGEN SATURATION: 97 % | WEIGHT: 198 LBS | HEIGHT: 64 IN | RESPIRATION RATE: 16 BRPM | TEMPERATURE: 99.2 F | SYSTOLIC BLOOD PRESSURE: 138 MMHG

## 2021-04-20 DIAGNOSIS — Z72.0 TOBACCO USE: ICD-10-CM

## 2021-04-20 DIAGNOSIS — Z91.89 FRAMINGHAM CARDIAC RISK 10-20% IN NEXT 10 YEARS: ICD-10-CM

## 2021-04-20 DIAGNOSIS — E66.9 OBESITY (BMI 30-39.9): ICD-10-CM

## 2021-04-20 DIAGNOSIS — I10 BENIGN ESSENTIAL HYPERTENSION: ICD-10-CM

## 2021-04-20 DIAGNOSIS — R42 LIGHTHEADEDNESS: Primary | ICD-10-CM

## 2021-04-20 PROCEDURE — 3079F DIAST BP 80-89 MM HG: CPT | Performed by: FAMILY MEDICINE

## 2021-04-20 PROCEDURE — 3075F SYST BP GE 130 - 139MM HG: CPT | Performed by: FAMILY MEDICINE

## 2021-04-20 PROCEDURE — 99214 OFFICE O/P EST MOD 30 MIN: CPT | Performed by: FAMILY MEDICINE

## 2021-04-20 PROCEDURE — 3008F BODY MASS INDEX DOCD: CPT | Performed by: FAMILY MEDICINE

## 2021-04-20 PROCEDURE — 3725F SCREEN DEPRESSION PERFORMED: CPT | Performed by: FAMILY MEDICINE

## 2021-04-20 RX ORDER — AMLODIPINE BESYLATE 5 MG/1
5 TABLET ORAL DAILY
Qty: 90 TABLET | Refills: 1 | Status: SHIPPED | OUTPATIENT
Start: 2021-04-20 | End: 2021-11-09 | Stop reason: SDUPTHER

## 2021-04-20 NOTE — PROGRESS NOTES
Assessment/Plan:    No problem-specific Assessment & Plan notes found for this encounter  Episodic dizziness with cardiac risk factors, including htn, tob, male, and hyperlipidemia, refer to cardiology possible stress testing    htn stable, cont meds    Tobacco risks aware, encouraged cessation    Declines statins for risk reduction     Diagnoses and all orders for this visit:    Lightheadedness  -     Ambulatory referral to Cardiology; Future    Benign essential hypertension  -     amLODIPine (NORVASC) 5 mg tablet; Take 1 tablet (5 mg total) by mouth daily  -     Ambulatory referral to Cardiology; Future    Tobacco use  -     Ambulatory referral to Cardiology; Future    [place] cardiac risk 10-20% in next 10 years  -     Ambulatory referral to Cardiology; Future    Obesity (BMI 30-39  9)        Return in about 6 months (around 10/20/2021)  Subjective:      Patient ID: Omar Gomez is a 54 y o  male  Chief Complaint   Patient presents with    Follow-up     6 month f/u-wmcma       HPI   still smoking <1ppd  Failed chantix bad nightmares    Taking bp meds    Getting lightheadedness  Daily, 1-2x/d  Can occur with walking, not vertigo  Can happen with cough  Ok at sitting  With exertion, normal exertion  No related to meals  Never when laying down  Overall few years, worse in 2-3w  Does not check bp with episodes that last 2-3 seconds  No cp  No palpitations  No sweats    The following portions of the patient's history were reviewed and updated as appropriate: allergies, current medications, past family history, past medical history, past social history, past surgical history and problem list     Review of Systems   Constitutional: Negative for fever  Respiratory: Negative for shortness of breath  Neurological: Positive for light-headedness  Negative for syncope           Current Outpatient Medications   Medication Sig Dispense Refill    amLODIPine (NORVASC) 5 mg tablet Take 1 tablet (5 mg total) by mouth daily 90 tablet 1    aspirin (ASPIR-81) 81 mg EC tablet Take by mouth daily      sildenafil (VIAGRA) 100 mg tablet Take 1 tablet (100 mg total) by mouth daily as needed for erectile dysfunction 30 tablet 5     No current facility-administered medications for this visit  Objective:    /80   Pulse (!) 121   Temp 99 2 °F (37 3 °C)   Resp 16   Ht 5' 3 5" (1 613 m)   Wt 89 8 kg (198 lb)   SpO2 97%   BMI 34 52 kg/m²        Physical Exam  Vitals signs and nursing note reviewed  Constitutional:       Appearance: He is well-developed  He is obese  He is not ill-appearing  HENT:      Head: Normocephalic  Right Ear: Tympanic membrane normal       Left Ear: Tympanic membrane normal    Eyes:      General: No scleral icterus  Conjunctiva/sclera: Conjunctivae normal    Neck:      Musculoskeletal: Neck supple  Cardiovascular:      Rate and Rhythm: Normal rate and regular rhythm  Heart sounds: No murmur  Pulmonary:      Effort: Pulmonary effort is normal  No respiratory distress  Abdominal:      General: There is no distension  Palpations: Abdomen is soft  Tenderness: There is no abdominal tenderness  Musculoskeletal:         General: No deformity  Right lower leg: No edema  Left lower leg: No edema  Skin:     General: Skin is warm and dry  Coloration: Skin is not pale  Neurological:      Mental Status: He is alert  Motor: No weakness  Gait: Gait normal    Psychiatric:         Mood and Affect: Mood normal          Behavior: Behavior normal          Thought Content: Thought content normal          BMI Counseling: Body mass index is 34 52 kg/m²  The BMI is above normal  Nutrition recommendations include decreasing portion sizes and moderation in carbohydrate intake  Exercise recommendations include exercising 3-5 times per week  No pharmacotherapy was ordered                Rick Fillers, DO

## 2021-05-07 ENCOUNTER — CONSULT (OUTPATIENT)
Dept: CARDIOLOGY CLINIC | Facility: CLINIC | Age: 51
End: 2021-05-07
Payer: COMMERCIAL

## 2021-05-07 VITALS
SYSTOLIC BLOOD PRESSURE: 148 MMHG | DIASTOLIC BLOOD PRESSURE: 82 MMHG | WEIGHT: 194.4 LBS | BODY MASS INDEX: 33.19 KG/M2 | HEIGHT: 64 IN | OXYGEN SATURATION: 98 % | HEART RATE: 99 BPM | TEMPERATURE: 99.2 F

## 2021-05-07 DIAGNOSIS — I10 BENIGN ESSENTIAL HYPERTENSION: Primary | ICD-10-CM

## 2021-05-07 DIAGNOSIS — R42 LIGHTHEADEDNESS: ICD-10-CM

## 2021-05-07 DIAGNOSIS — Z72.0 TOBACCO USE: ICD-10-CM

## 2021-05-07 DIAGNOSIS — Z91.89 FRAMINGHAM CARDIAC RISK 10-20% IN NEXT 10 YEARS: ICD-10-CM

## 2021-05-07 PROCEDURE — 93000 ELECTROCARDIOGRAM COMPLETE: CPT | Performed by: INTERNAL MEDICINE

## 2021-05-07 PROCEDURE — 3077F SYST BP >= 140 MM HG: CPT | Performed by: INTERNAL MEDICINE

## 2021-05-07 PROCEDURE — 3008F BODY MASS INDEX DOCD: CPT | Performed by: INTERNAL MEDICINE

## 2021-05-07 PROCEDURE — 3079F DIAST BP 80-89 MM HG: CPT | Performed by: INTERNAL MEDICINE

## 2021-05-07 PROCEDURE — 99243 OFF/OP CNSLTJ NEW/EST LOW 30: CPT | Performed by: INTERNAL MEDICINE

## 2021-05-07 PROCEDURE — 1036F TOBACCO NON-USER: CPT | Performed by: INTERNAL MEDICINE

## 2021-05-07 NOTE — PROGRESS NOTES
[facility]  [address] [place] [address] [address]   [place], [address]    Cardiology Consultation    Florinda Sharma  [phone]  1970      Consult for: Dizziness  Appreciate consult by: Dionisio Reeves DO      Discussion/Summary:     1  Benign essential hypertension  Ambulatory referral to Cardiology    POCT ECG   2  Tobacco use  Ambulatory referral to Cardiology   3  [place] cardiac risk 10-20% in next 10 years  Ambulatory referral to Cardiology   4  Lightheadedness  Ambulatory referral to Cardiology    POCT ECG      - Patient with intermittent episodes of palpitations with last occurred 3 weeks ago  - Continue working on risk reduction with diet modification   - Holter monitor ordered - he should get it done if symptoms persist   - Smoking cessation   - May discontinue ASA  - Add statin if LDL does not decrease with diet modification  HPI:     Florinda Sharma is a 54 y o  here for evaluation of lightheadedness  The patient has had a few episodes of lightheadedness over the past few months  He states episodes last for a few seconds and then resolve  He describes it as a feeling of lightheadness that  Can occur at various times throughout the day  Did not occur with exertion  Denied any associated palpitations, chest pain, syncope or near syncope  Recently, blood pressure has been elevated because of  Weight gain  He also has elevated cholesterol levels  He denies any prior cardiac workup  Prior to recent weight gain, he did not have any lightheadedness  Diet includes processed food  Does not eat fruits and vegetables regularly  Quit smoking 1 week ago       Past Medical History:   Diagnosis Date    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    Nicotine dependence 08/17/2009    Vertigo     last [id number]/13    Viral warts 08/27/2010     Social History     Tobacco Use    Smoking status: Former Smoker     Types: Cigarettes     Quit date: 5/1/2021     Years since quittin 0    Smokeless tobacco: Never Used   Substance Use Topics    Alcohol use: Yes     Comment: social     Drug use: Never      Family History   Problem Relation Age of Onset    Hypertension Father      No past surgical history on file  Current Outpatient Medications:     amLODIPine (NORVASC) 5 mg tablet, Take 1 tablet (5 mg total) by mouth daily, Disp: 90 tablet, Rfl: 1    aspirin (ASPIR-81) 81 mg EC tablet, Take by mouth daily, Disp: , Rfl:     sildenafil (VIAGRA) 100 mg tablet, Take 1 tablet (100 mg total) by mouth daily as needed for erectile dysfunction, Disp: 30 tablet, Rfl: 5  No Known Allergies    Review of Systems:   Review of Systems   Respiratory: Negative for shortness of breath  Cardiovascular: Negative for chest pain, palpitations and leg swelling  Neurological: Positive for light-headedness  Negative for syncope  All other systems reviewed and are negative  Physical Examination:     Vitals:    21 1610   BP: 148/82   BP Location: Left arm   Patient Position: Sitting   Cuff Size: Standard   Pulse: 99   Temp: 99 2 °F (37 3 °C)   TempSrc: Temporal   SpO2: 98%   Weight: 88 2 kg (194 lb 6 4 oz)   Height: 5' 3 5" (1 613 m)       Physical Exam   Constitutional: He appears healthy  No distress  Eyes: Pupils are equal, round, and reactive to light  Conjunctivae are normal    Neck: Normal range of motion  Neck supple  No JVD present  Cardiovascular: Normal rate, regular rhythm and normal heart sounds  Exam reveals no gallop and no friction rub  No murmur heard  Pulmonary/Chest: Effort normal and breath sounds normal  He has no wheezes  He has no rales  Musculoskeletal:         General: No tenderness, deformity or edema  Neurological: He is alert and oriented to person, place, and time  Skin: Skin is warm and dry  No rash noted          Labs:     Lab Results   Component Value Date    WBC 8 7 2021    HGB 14 7 2021    HCT 43 8 2021    MCV 92 01/13/2021    RDW 13 2 01/13/2021     01/13/2021     BMP:  Lab Results   Component Value Date    SODIUM 139 01/13/2021    K 4 7 01/13/2021     01/13/2021    CO2 25 01/13/2021    BUN 15 01/13/2021    CREATININE 0 83 01/13/2021    GLUC 88 01/13/2021    CALCIUM 9 4 10/07/2017    MG 1 9 01/13/2021     LFT:  Lab Results   Component Value Date    AST 23 01/13/2021    ALT 35 01/13/2021    ALKPHOS 37 (L) 10/07/2017    TP 7 0 01/13/2021    ALB 4 6 01/13/2021      Lab Results   Component Value Date    OCD8WZBFZFEC 0 971 04/09/2016     No results found for: HGBA1C  Lipid Profile:   Lab Results   Component Value Date    CHOLESTEROL 216 (H) 01/13/2021    HDL 41 01/13/2021    LDLCALC 150 (H) 01/13/2021    TRIG 136 01/13/2021     Lab Results   Component Value Date    CHOLESTEROL 216 (H) 01/13/2021    CHOLESTEROL 223 (H) 06/22/2019     Lab Results   Component Value Date    CKTOTAL 194 01/13/2021     No results found for: NTBNP   No results found for this or any previous visit (from the past 672 hour(s))  Imaging & Testing   I have personally reviewed pertinent reports  Cardiac Testing   No results found for this or any previous visit  EKG: Personally reviewed  NSR with rate 100 bpm      Fatou Stokes DO, [place]  [phone]  Please call with any questions

## 2021-08-03 ENCOUNTER — OFFICE VISIT (OUTPATIENT)
Dept: FAMILY MEDICINE CLINIC | Facility: CLINIC | Age: 51
End: 2021-08-03
Payer: COMMERCIAL

## 2021-08-03 VITALS
HEART RATE: 91 BPM | RESPIRATION RATE: 18 BRPM | SYSTOLIC BLOOD PRESSURE: 118 MMHG | DIASTOLIC BLOOD PRESSURE: 70 MMHG | TEMPERATURE: 97.8 F | BODY MASS INDEX: 32.44 KG/M2 | WEIGHT: 190 LBS | OXYGEN SATURATION: 98 % | HEIGHT: 64 IN

## 2021-08-03 DIAGNOSIS — I10 BENIGN ESSENTIAL HYPERTENSION: ICD-10-CM

## 2021-08-03 DIAGNOSIS — H66.90 ACUTE OTITIS MEDIA, UNSPECIFIED OTITIS MEDIA TYPE: Primary | ICD-10-CM

## 2021-08-03 PROCEDURE — 3008F BODY MASS INDEX DOCD: CPT | Performed by: NURSE PRACTITIONER

## 2021-08-03 PROCEDURE — 3078F DIAST BP <80 MM HG: CPT | Performed by: NURSE PRACTITIONER

## 2021-08-03 PROCEDURE — 1036F TOBACCO NON-USER: CPT | Performed by: NURSE PRACTITIONER

## 2021-08-03 PROCEDURE — 3725F SCREEN DEPRESSION PERFORMED: CPT | Performed by: NURSE PRACTITIONER

## 2021-08-03 PROCEDURE — 99213 OFFICE O/P EST LOW 20 MIN: CPT | Performed by: NURSE PRACTITIONER

## 2021-08-03 PROCEDURE — 3074F SYST BP LT 130 MM HG: CPT | Performed by: NURSE PRACTITIONER

## 2021-08-03 RX ORDER — AMOXICILLIN 875 MG/1
875 TABLET, COATED ORAL 2 TIMES DAILY
Qty: 20 TABLET | Refills: 0 | Status: SHIPPED | OUTPATIENT
Start: 2021-08-03 | End: 2021-08-13

## 2021-08-03 NOTE — PROGRESS NOTES
Assessment/Plan:    Trial of Flonase, suspect Zyrtec was causing his dizziness  RTO for persistent/worsening symptoms    1  Acute otitis media, unspecified otitis media type  -     amoxicillin (AMOXIL) 875 mg tablet; Take 1 tablet (875 mg total) by mouth 2 (two) times a day for 10 days    2  Benign essential hypertension  Assessment & Plan:  Stable on Amlodipine  There are no Patient Instructions on file for this visit  Return if symptoms worsen or fail to improve  Subjective:      Patient ID: Noreen Trent is a 54 y o  male  Chief Complaint   Patient presents with    L ear pain     started 2 weeks ago  [id number]       Here today with complaints of left ear pain that has been ongoing for the past couple of weeks  Pain radiates into his neck and into his head  Has had ear infections in the past, feels similar  Has some issues with hearing and has chronic sinus issues  No ear drainage  Was told in the past to take Zyrtec and Flonase, but recalls the last time he took these, he felt very dizzy, unsure which caused it  The following portions of the patient's history were reviewed and updated as appropriate: allergies, current medications, past family history, past medical history, past social history, past surgical history and problem list     Review of Systems   Constitutional: Negative for chills, fatigue and fever  HENT: Positive for ear pain  Negative for congestion, postnasal drip, rhinorrhea, sinus pressure and sore throat  Respiratory: Negative for cough, shortness of breath and wheezing  Cardiovascular: Negative for chest pain  Gastrointestinal: Negative for abdominal pain, diarrhea, nausea and vomiting  Musculoskeletal: Negative for arthralgias  Skin: Negative for rash  Neurological: Positive for headaches           Current Outpatient Medications   Medication Sig Dispense Refill    amLODIPine (NORVASC) 5 mg tablet Take 1 tablet (5 mg total) by mouth daily 90 tablet 1    sildenafil (VIAGRA) 100 mg tablet Take 1 tablet (100 mg total) by mouth daily as needed for erectile dysfunction 30 tablet 5    amoxicillin (AMOXIL) 875 mg tablet Take 1 tablet (875 mg total) by mouth 2 (two) times a day for 10 days 20 tablet 0    aspirin (ASPIR-81) 81 mg EC tablet Take by mouth daily (Patient not taking: Reported on 8/3/2021)       No current facility-administered medications for this visit  Objective:    /70   Pulse 91   Temp 97 8 °F (36 6 °C)   Resp 18   Ht 5' 3 5" (1 613 m)   Wt 86 2 kg (190 lb)   SpO2 98%   BMI 33 13 kg/m²        Physical Exam  Vitals and nursing note reviewed  Constitutional:       Appearance: Normal appearance  He is well-developed  HENT:      Head: Normocephalic and atraumatic  Right Ear: Tympanic membrane, ear canal and external ear normal       Left Ear: Ear canal and external ear normal  Tympanic membrane is erythematous and bulging  Nose: Nose normal  No mucosal edema  Eyes:      Conjunctiva/sclera: Conjunctivae normal       Pupils: Pupils are equal, round, and reactive to light  Neck:      Thyroid: No thyromegaly  Vascular: No carotid bruit  Cardiovascular:      Rate and Rhythm: Normal rate and regular rhythm  Heart sounds: Normal heart sounds  No murmur heard  Pulmonary:      Effort: Pulmonary effort is normal       Breath sounds: Normal breath sounds  Abdominal:      Palpations: Abdomen is soft  There is no hepatomegaly or splenomegaly  Tenderness: There is no abdominal tenderness  Musculoskeletal:      Cervical back: Full passive range of motion without pain and normal range of motion  No edema  Lymphadenopathy:      Cervical:      Right cervical: No superficial cervical adenopathy  Left cervical: No superficial cervical adenopathy  Skin:     General: Skin is warm and dry  Findings: No rash  Neurological:      Mental Status: He is alert        Deep Tendon Reflexes: Reflexes are normal and symmetric     Psychiatric:         Mood and Affect: Mood normal          Behavior: Behavior normal                 MELQUIADES Bai

## 2021-11-06 PROBLEM — R42 LIGHTHEADEDNESS: Status: RESOLVED | Noted: 2021-04-20 | Resolved: 2021-11-06

## 2021-11-06 PROBLEM — M79.10 MYALGIA: Status: RESOLVED | Noted: 2020-10-20 | Resolved: 2021-11-06

## 2021-11-09 ENCOUNTER — OFFICE VISIT (OUTPATIENT)
Dept: FAMILY MEDICINE CLINIC | Facility: CLINIC | Age: 51
End: 2021-11-09
Payer: COMMERCIAL

## 2021-11-09 VITALS
BODY MASS INDEX: 33.12 KG/M2 | HEART RATE: 95 BPM | OXYGEN SATURATION: 96 % | TEMPERATURE: 98.3 F | HEIGHT: 64 IN | DIASTOLIC BLOOD PRESSURE: 84 MMHG | RESPIRATION RATE: 14 BRPM | WEIGHT: 194 LBS | SYSTOLIC BLOOD PRESSURE: 132 MMHG

## 2021-11-09 DIAGNOSIS — Z13.6 SCREENING FOR CARDIOVASCULAR, RESPIRATORY, AND GENITOURINARY DISEASES: ICD-10-CM

## 2021-11-09 DIAGNOSIS — E66.9 OBESITY (BMI 30-39.9): ICD-10-CM

## 2021-11-09 DIAGNOSIS — Z12.5 PROSTATE CANCER SCREENING: ICD-10-CM

## 2021-11-09 DIAGNOSIS — Z13.89 SCREENING FOR CARDIOVASCULAR, RESPIRATORY, AND GENITOURINARY DISEASES: ICD-10-CM

## 2021-11-09 DIAGNOSIS — Z13.1 SCREENING FOR DIABETES MELLITUS (DM): ICD-10-CM

## 2021-11-09 DIAGNOSIS — I10 BENIGN ESSENTIAL HYPERTENSION: Primary | ICD-10-CM

## 2021-11-09 DIAGNOSIS — Z13.83 SCREENING FOR CARDIOVASCULAR, RESPIRATORY, AND GENITOURINARY DISEASES: ICD-10-CM

## 2021-11-09 DIAGNOSIS — Z87.891 HISTORY OF TOBACCO USE: ICD-10-CM

## 2021-11-09 DIAGNOSIS — Z12.11 COLON CANCER SCREENING: ICD-10-CM

## 2021-11-09 DIAGNOSIS — M79.10 MYALGIA: ICD-10-CM

## 2021-11-09 PROCEDURE — 3075F SYST BP GE 130 - 139MM HG: CPT | Performed by: FAMILY MEDICINE

## 2021-11-09 PROCEDURE — 3079F DIAST BP 80-89 MM HG: CPT | Performed by: FAMILY MEDICINE

## 2021-11-09 PROCEDURE — 99214 OFFICE O/P EST MOD 30 MIN: CPT | Performed by: FAMILY MEDICINE

## 2021-11-09 PROCEDURE — 3008F BODY MASS INDEX DOCD: CPT | Performed by: FAMILY MEDICINE

## 2021-11-09 PROCEDURE — 1036F TOBACCO NON-USER: CPT | Performed by: FAMILY MEDICINE

## 2021-11-09 RX ORDER — AMLODIPINE BESYLATE 5 MG/1
5 TABLET ORAL DAILY
Qty: 90 TABLET | Refills: 1 | Status: SHIPPED | OUTPATIENT
Start: 2021-11-09 | End: 2022-05-10 | Stop reason: SDUPTHER

## 2021-11-09 RX ORDER — INDOMETHACIN 50 MG/1
CAPSULE ORAL
COMMUNITY
Start: 2021-10-28 | End: 2022-05-07

## 2021-11-09 RX ORDER — CARISOPRODOL 350 MG/1
350 TABLET ORAL 2 TIMES DAILY
COMMUNITY
End: 2022-05-07

## 2021-11-10 PROBLEM — B07.0 PLANTAR WARTS: Status: RESOLVED | Noted: 2020-10-20 | Resolved: 2021-11-10

## 2021-12-13 ENCOUNTER — TELEPHONE (OUTPATIENT)
Dept: FAMILY MEDICINE CLINIC | Facility: CLINIC | Age: 51
End: 2021-12-13

## 2021-12-13 DIAGNOSIS — Z20.822 EXPOSURE TO COVID-19 VIRUS: Primary | ICD-10-CM

## 2022-01-27 ENCOUNTER — TELEPHONE (OUTPATIENT)
Dept: GASTROENTEROLOGY | Facility: CLINIC | Age: 52
End: 2022-01-27

## 2022-01-27 NOTE — TELEPHONE ENCOUNTER
01/27/22  Screened by: Michela Mcmahon    Referring Provider Dr Laura Donohue: There is no height or weight on file to calculate BMI  Has patient been referred for a routine screening Colonoscopy? yes  Is the patient between 39-70 years old? yes      Previous Colonoscopy no   If yes:    Date: na    Facility: na    Reason: na      SCHEDULING STAFF: If the patient is between 45yrs-49yrs, please advise patient to confirm benefits/coverage with their insurance company for a routine screening colonoscopy, some insurance carriers will only cover at [facility] or [facility]  If the patient is over 66years old, please schedule an office visit  Does the patient want to see a Gastroenterologist prior to their procedure OR are they having any GI symptoms? no    Has the patient been hospitalized or had abdominal surgery in the past 6 months? no    Does the patient use supplemental oxygen? no    Does the patient take Coumadin, Lovenox, Plavix, Elliquis, Xarelto, or other blood thinning medication? no    Has the patient had a stroke, cardiac event, or stent placed in the past year? no    SCHEDULING STAFF: If patient answers NO to above questions, then schedule procedure  If patient answers YES to above questions, then schedule office appointment  If patient is between 45yrs - 49yrs, please advise patient that we will have to confirm benefits & coverage with their insurance company for a routine screening colonoscopy

## 2022-03-20 NOTE — PROGRESS NOTES
Assessment and Plan:   Mr Domenica Santana is a 46-year-old male with history significant for bilateral knee and hip osteoarthritis who presents for evaluation of his joint pains  He is referred by Dr Darek Hicks for a rheumatology consult  Obi Jha presents today for further evaluation of chronic left hip and bilateral knee pain that has been progressively worsening over the past few years  He does not report additional joint involvement or symptoms that would be concerning for an inflammatory arthritis and there is no synovitis noted on his examination today  Given the distribution of his joint pains as well as prior radiological evidence of osteoarthritis affecting these joints I advised him that he is likely presenting with primary osteoarthritis  For management of this I will refer him back to orthopedics and repeat x-rays for their review  He may continue the Advil as needed in the interim  Plan:  Diagnoses and all orders for this visit:    Chronic pain of both knees  -     Sjogren's Antibodies; Future  -     Sedimentation rate, automated; Future  -     C-reactive protein; Future  -     RF Screen w/ Reflex to Titer; Future  -     Cyclic citrul peptide antibody, IgG; Future  -     Uric acid; Future  -     HLA-B27 antigen; Future  -     Ambulatory referral to Rheumatology  -     XR knee 3 vw left non injury; Future  -     XR knee 3 vw right non injury; Future  -     XR hips bilateral 3-4 vw w pelvis if performed; Future  -     Ambulatory Referral to Orthopedic Surgery; Future    Primary generalized (osteo)arthritis  -     XR knee 3 vw left non injury; Future  -     XR knee 3 vw right non injury; Future  -     XR hips bilateral 3-4 vw w pelvis if performed; Future  -     Ambulatory Referral to Orthopedic Surgery;  Future    Other orders  -     amoxicillin-clavulanate (AUGMENTIN) 875-125 mg per tablet  -     cephalexin (KEFLEX) 500 mg capsule      I have personally reviewed pertinent films in PACS of the left knee XR which shows DJD  Activities as tolerated  Exercise: try to maintain a low impact exercise regimen as much as possible  Walk for 30 minutes a day for at least 3 days a week  Continue other medications as prescribed by PCP and other specialists  RTC PRN  HPI  Mr Carleene Bumpers is a 29-year-old male with history significant for bilateral knee and hip osteoarthritis who presents for evaluation of his joint pains  He is referred by Dr David Vanegas for a rheumatology consult  Patient reports approximately 12 years ago he sustained an injury to his right knee resulting in a meniscal tear for which he did not seek medical attention  He reports since then he has had gradual progression of his right knee pain and recently also affecting his left knee over the past few years  If he is working his symptoms are present fairly constantly and he will have to take ibuprofen 600 mg twice daily which does help to some degree  At rest he does not experience significant pain  Over the past few years he has also noticed pain arise in his left groin region which will feel like a sharp pain radiating into his thigh  No significant joint pains in his hands, wrists, elbows, shoulders, right groin, ankles or feet  He reports occasional swelling of his right knee  He experiences morning stiffness affecting him diffusely which improves within a few minutes  He also describes gelling phenomenon  He denies fevers, unintentional weight loss, dry eyes, dry mouth, inflammatory eye disease, skin rash, psoriasis, photosensitivity, mouth/nose ulcers, swollen glands, pleuritic chest pain, inflammatory bowel disease, blood clots, Raynaud's or a family history of autoimmune disease      The following portions of the patient's history were reviewed and updated as appropriate: allergies, current medications, past family history, past medical history, past social history, past surgical history and problem list       Review of Systems  Constitutional: Negative for weight change, fevers, chills, night sweats, fatigue  ENT/Mouth: Negative for hearing changes, ear pain, nasal congestion, sinus pain, hoarseness, sore throat, rhinorrhea, swallowing difficulty  Eyes: Negative for pain, redness, discharge, vision changes  Cardiovascular: Negative for chest pain, SOB, palpitations  Respiratory: Negative for cough, sputum, wheezing, dyspnea  Gastrointestinal: Negative for nausea, vomiting, diarrhea, constipation, pain, heartburn  Genitourinary: Negative for dysuria, urinary frequency, hematuria  Musculoskeletal: As per HPI  Skin: Negative for skin rash, color changes  Neuro: Negative for weakness, numbness, tingling, loss of consciousness  Psych: Negative for anxiety, depression  Heme/Lymph: Negative for easy bruising, bleeding, lymphadenopathy  Past Medical History:   Diagnosis Date    Neoplasm of bone 2010    Nicotine dependence 2009    Vertigo     last [id number]/13    Viral warts 2010       History reviewed  No pertinent surgical history        Social History     Socioeconomic History    Marital status: /Civil Union     Spouse name: Not on file    Number of children: Not on file    Years of education: Not on file    Highest education level: Not on file   Occupational History    Not on file   Tobacco Use    Smoking status: Former Smoker     Types: Cigarettes     Quit date: 2021     Years since quittin 8    Smokeless tobacco: Never Used   Vaping Use    Vaping Use: Never used   Substance and Sexual Activity    Alcohol use: Yes     Comment: social     Drug use: Never    Sexual activity: Not on file   Other Topics Concern    Not on file   Social History Narrative    Not on file     Social Determinants of Health     Financial Resource Strain: Not on file   Food Insecurity: Not on file   Transportation Needs: Not on file   Physical Activity: Not on file Stress: Not on file   Social Connections: Not on file   Intimate Partner Violence: Not on file   Housing Stability: Not on file       Family History   Problem Relation Age of Onset    Hypertension Father        No Known Allergies      Current Outpatient Medications:     amLODIPine (NORVASC) 5 mg tablet, Take 1 tablet (5 mg total) by mouth daily, Disp: 90 tablet, Rfl: 1    amoxicillin-clavulanate (AUGMENTIN) 875-125 mg per tablet, , Disp: , Rfl:     carisoprodol (SOMA) 350 mg tablet, Take 350 mg by mouth 2 (two) times a day As needed, Disp: , Rfl:     cephalexin (KEFLEX) 500 mg capsule, , Disp: , Rfl:     indomethacin (INDOCIN) 50 mg capsule, , Disp: , Rfl:     sildenafil (VIAGRA) 100 mg tablet, Take 1 tablet (100 mg total) by mouth daily as needed for erectile dysfunction, Disp: 30 tablet, Rfl: 5      Objective:    Vitals:    03/21/22 1403   BP: 132/84   Pulse: (!) 111   Temp: 98 4 °F (36 9 °C)   Weight: 88 9 kg (196 lb)       Physical Exam  General: Well appearing, well nourished, in no distress  Oriented x 3, normal mood and affect  Ambulating without difficulty  Skin: Good turgor, no rash, unusual bruising or prominent lesions  Hair: Normal texture and distribution  Nails: Normal color, no deformities  HEENT:  Head: Normocephalic, atraumatic  Eyes: Conjunctiva clear, sclera non-icteric, EOM intact  Extremities: No amputations or deformities, cyanosis, edema  Musculoskeletal:   Hands, wrists, elbows, shoulders, ankles and feet - unremarkable  Hips - no tenderness to direct palpation of the left groin  Knees - no tenderness or soft tissue swelling noted bilaterally but he has significant crepitus noted and pain on range of motion  Neurologic: Alert and oriented  No focal neurological deficits appreciated  Psychiatric: Normal mood and affect  MARLYN Coronado    Rheumatology

## 2022-03-21 ENCOUNTER — OFFICE VISIT (OUTPATIENT)
Dept: RHEUMATOLOGY | Facility: CLINIC | Age: 52
End: 2022-03-21
Payer: COMMERCIAL

## 2022-03-21 ENCOUNTER — APPOINTMENT (OUTPATIENT)
Dept: RADIOLOGY | Facility: CLINIC | Age: 52
End: 2022-03-21
Payer: COMMERCIAL

## 2022-03-21 VITALS
TEMPERATURE: 98.4 F | SYSTOLIC BLOOD PRESSURE: 132 MMHG | HEART RATE: 111 BPM | DIASTOLIC BLOOD PRESSURE: 84 MMHG | WEIGHT: 196 LBS | BODY MASS INDEX: 34.18 KG/M2

## 2022-03-21 DIAGNOSIS — M15.0 PRIMARY GENERALIZED (OSTEO)ARTHRITIS: ICD-10-CM

## 2022-03-21 DIAGNOSIS — G89.29 CHRONIC PAIN OF BOTH KNEES: Primary | ICD-10-CM

## 2022-03-21 DIAGNOSIS — M25.562 CHRONIC PAIN OF BOTH KNEES: ICD-10-CM

## 2022-03-21 DIAGNOSIS — M25.561 CHRONIC PAIN OF BOTH KNEES: Primary | ICD-10-CM

## 2022-03-21 DIAGNOSIS — M25.562 CHRONIC PAIN OF BOTH KNEES: Primary | ICD-10-CM

## 2022-03-21 DIAGNOSIS — M25.561 CHRONIC PAIN OF BOTH KNEES: ICD-10-CM

## 2022-03-21 DIAGNOSIS — G89.29 CHRONIC PAIN OF BOTH KNEES: ICD-10-CM

## 2022-03-21 PROCEDURE — 73562 X-RAY EXAM OF KNEE 3: CPT

## 2022-03-21 PROCEDURE — 99205 OFFICE O/P NEW HI 60 MIN: CPT | Performed by: INTERNAL MEDICINE

## 2022-03-21 PROCEDURE — 73522 X-RAY EXAM HIPS BI 3-4 VIEWS: CPT

## 2022-03-21 RX ORDER — AMOXICILLIN AND CLAVULANATE POTASSIUM 875; 125 MG/1; MG/1
TABLET, FILM COATED ORAL
COMMUNITY
Start: 2022-02-25 | End: 2022-05-07

## 2022-03-21 RX ORDER — CEPHALEXIN 500 MG/1
CAPSULE ORAL
COMMUNITY
Start: 2022-03-01 | End: 2022-05-07

## 2022-03-25 ENCOUNTER — CONSULT (OUTPATIENT)
Dept: OBGYN CLINIC | Facility: CLINIC | Age: 52
End: 2022-03-25
Payer: COMMERCIAL

## 2022-03-25 VITALS
TEMPERATURE: 97.7 F | WEIGHT: 194 LBS | SYSTOLIC BLOOD PRESSURE: 143 MMHG | DIASTOLIC BLOOD PRESSURE: 95 MMHG | BODY MASS INDEX: 34.38 KG/M2 | HEIGHT: 63 IN | HEART RATE: 109 BPM

## 2022-03-25 DIAGNOSIS — M16.12 PRIMARY OSTEOARTHRITIS OF LEFT HIP: ICD-10-CM

## 2022-03-25 DIAGNOSIS — M17.12 PRIMARY LOCALIZED OSTEOARTHRITIS OF LEFT KNEE: ICD-10-CM

## 2022-03-25 DIAGNOSIS — M17.11 PRIMARY LOCALIZED OSTEOARTHRITIS OF RIGHT KNEE: ICD-10-CM

## 2022-03-25 DIAGNOSIS — M25.859 FEMORAL ACETABULAR IMPINGEMENT: Primary | ICD-10-CM

## 2022-03-25 PROCEDURE — 20610 DRAIN/INJ JOINT/BURSA W/O US: CPT | Performed by: ORTHOPAEDIC SURGERY

## 2022-03-25 PROCEDURE — 3008F BODY MASS INDEX DOCD: CPT | Performed by: ORTHOPAEDIC SURGERY

## 2022-03-25 PROCEDURE — 99213 OFFICE O/P EST LOW 20 MIN: CPT | Performed by: ORTHOPAEDIC SURGERY

## 2022-03-25 PROCEDURE — 1036F TOBACCO NON-USER: CPT | Performed by: ORTHOPAEDIC SURGERY

## 2022-03-25 PROCEDURE — 3077F SYST BP >= 140 MM HG: CPT | Performed by: ORTHOPAEDIC SURGERY

## 2022-03-25 PROCEDURE — 3080F DIAST BP >= 90 MM HG: CPT | Performed by: ORTHOPAEDIC SURGERY

## 2022-03-25 RX ORDER — DEXAMETHASONE SODIUM PHOSPHATE 100 MG/10ML
40 INJECTION INTRAMUSCULAR; INTRAVENOUS
Status: COMPLETED | OUTPATIENT
Start: 2022-03-25 | End: 2022-03-25

## 2022-03-25 RX ORDER — LIDOCAINE HYDROCHLORIDE 10 MG/ML
4 INJECTION, SOLUTION INFILTRATION; PERINEURAL
Status: COMPLETED | OUTPATIENT
Start: 2022-03-25 | End: 2022-03-25

## 2022-03-25 RX ADMIN — LIDOCAINE HYDROCHLORIDE 4 ML: 10 INJECTION, SOLUTION INFILTRATION; PERINEURAL at 11:44

## 2022-03-25 RX ADMIN — DEXAMETHASONE SODIUM PHOSPHATE 40 MG: 100 INJECTION INTRAMUSCULAR; INTRAVENOUS at 11:44

## 2022-03-25 NOTE — PROGRESS NOTES
Assessment/Plan:  1  Femoral acetabular impingement  Ambulatory Referral to Orthopedic Surgery    Ambulatory referral to Physical Therapy   2  Primary localized osteoarthritis of left knee     3  Primary localized osteoarthritis of right knee     4  Primary osteoarthritis of left hip  Ambulatory Referral to Orthopedic Surgery    Ambulatory referral to Physical Therapy       [place] has left-sided hip pain consistent with femoral impingement and cam lesion as well as mild osteoarthritis  I recommended he start with formal physical therapy to increase his range of motion help with his persistent hip pain  I also recommended a ultrasound-guided left hip cortisone injection that can be provided by Dr Kathleen Clark in pain management  He was given information for scheduling this injection  We also proceed with a right knee cortisone injection today for his osteoarthritis as this has helped him in the past   He is not feeling much discomfort the left knee but we could consider the same treatment if needed in the future  I also briefly discussed the possibility of Visco supplement injections in the future if needed  He will follow up with me as needed for both issues  Large joint arthrocentesis: R knee  Universal Protocol:  Consent: Verbal consent obtained  Risks and benefits: risks, benefits and alternatives were discussed  Consent given by: patient  Time out: Immediately prior to procedure a "time out" was called to verify the correct patient, procedure, equipment, support staff and site/side marked as required    Site marked: the operative site was marked  Supporting Documentation  Indications: pain   Procedure Details  Location: knee - R knee  Preparation: Patient was prepped and draped in the usual sterile fashion  Needle size: 22 G  Ultrasound guidance: no  Approach: anterolateral  Medications administered: 40 mg dexamethasone 100 mg/10 mL; 4 mL lidocaine 1 %    Patient tolerance: patient tolerated the procedure well with no immediate complications  Dressing:  Sterile dressing applied          Subjective:   Olivia Freire is a 50 y o  male who presents to the office for evaluation multiple issues  He has a history of bilateral knee osteoarthritis and we have treated him in the past with cortisone injections most recently 2 years ago  He recently saw Dr Christal Pallas in Rheumatology in recent x-rays were obtained showing worsening of his knee osteoarthritis and is referred back to our office today  He has pain that is aching and throbbing intermittent mostly bothers his right knee  He does not have significant pain in his left knee this time  The pain worsens with walking and squatting and climbing ladders  It aches at the end of the day of work  He denies any recent injury or trauma  Denies any recent swelling or effusion  He also has left-sided hip pain that is aching and throbbing intermittent  At times it can become sharp and radiates down the medial aspect of the left thigh  It radiates into his groin  He does not have any pain to the lateral hip  He has had x-rays in the past demonstrating cam deformities bilaterally  Review of Systems   Constitutional: Negative for chills, fever and unexpected weight change  HENT: Negative for hearing loss, nosebleeds and sore throat  Eyes: Negative for pain, redness and visual disturbance  Respiratory: Negative for cough, shortness of breath and wheezing  Cardiovascular: Negative for chest pain, palpitations and leg swelling  Gastrointestinal: Negative for abdominal pain, nausea and vomiting  Endocrine: Negative for polyphagia and polyuria  Genitourinary: Negative for dysuria and hematuria  Musculoskeletal:        See HPI   Skin: Negative for rash and wound  Neurological: Negative for dizziness, numbness and headaches  Psychiatric/Behavioral: Negative for decreased concentration and suicidal ideas  The patient is not nervous/anxious            Past Medical History:   Diagnosis Date    Neoplasm of bone 2010    Nicotine dependence 2009    Vertigo     last [id number]/09/13    Viral warts 2010       History reviewed  No pertinent surgical history  Family History   Problem Relation Age of Onset    Hypertension Father        Social History     Occupational History    Not on file   Tobacco Use    Smoking status: Former Smoker     Types: Cigarettes     Quit date: 2021     Years since quittin 8    Smokeless tobacco: Never Used   Vaping Use    Vaping Use: Never used   Substance and Sexual Activity    Alcohol use: Yes     Comment: social     Drug use: Never    Sexual activity: Not on file         Current Outpatient Medications:     amLODIPine (NORVASC) 5 mg tablet, Take 1 tablet (5 mg total) by mouth daily, Disp: 90 tablet, Rfl: 1    amoxicillin-clavulanate (AUGMENTIN) 875-125 mg per tablet, , Disp: , Rfl:     carisoprodol (SOMA) 350 mg tablet, Take 350 mg by mouth 2 (two) times a day As needed (Patient not taking: Reported on 3/25/2022 ), Disp: , Rfl:     cephalexin (KEFLEX) 500 mg capsule, , Disp: , Rfl:     indomethacin (INDOCIN) 50 mg capsule, , Disp: , Rfl:     sildenafil (VIAGRA) 100 mg tablet, Take 1 tablet (100 mg total) by mouth daily as needed for erectile dysfunction (Patient not taking: Reported on 3/25/2022 ), Disp: 30 tablet, Rfl: 5    No Known Allergies    Objective:  Vitals:    22 1105   BP: 143/95   Pulse: (!) 109   Temp: 97 7 °F (36 5 °C)       Right Knee Exam     Tenderness   The patient is experiencing tenderness in the medial joint line  Range of Motion   Extension: normal   Flexion: normal     Other   Erythema: absent  Sensation: normal  Pulse: present  Swelling: none  Effusion: no effusion present      Left Knee Exam     Tenderness   The patient is experiencing no tenderness       Range of Motion   Extension: normal   Flexion: normal     Other   Erythema: absent  Sensation: normal  Pulse: present  Swelling: none  Effusion: no effusion present      Left Hip Exam     Tenderness   The patient is experiencing tenderness in the anterior  Range of Motion   Flexion:  90 abnormal   External rotation:  60 abnormal   Internal rotation: 5 abnormal     Other   Erythema: absent  Sensation: normal  Pulse: present          Observations   Left Knee   Negative for effusion  Right Knee   Negative for effusion  Physical Exam  Vitals and nursing note reviewed  Constitutional:       Appearance: He is well-developed  HENT:      Head: Normocephalic and atraumatic  Eyes:      General: No scleral icterus  Conjunctiva/sclera: Conjunctivae normal    Cardiovascular:      Rate and Rhythm: Normal rate  Pulmonary:      Effort: Pulmonary effort is normal  No respiratory distress  Musculoskeletal:      Cervical back: Normal range of motion and neck supple  Right knee: No effusion  Left knee: No effusion  Comments: As noted in HPI   Skin:     General: Skin is warm and dry  Neurological:      Mental Status: He is alert and oriented to person, place, and time  Psychiatric:         Behavior: Behavior normal            I have personally reviewed pertinent films in PACS and my interpretation is as follows:  X-rays of bilateral knees demonstrate moderate osteoarthritis  No evidence of acute fracture  Advancement of osteoarthritis compared to 2 years prior  X-rays of the left hip demonstrate mild to moderate hip osteoarthritis and cam deformity present in bilateral hips

## 2022-03-31 ENCOUNTER — TELEPHONE (OUTPATIENT)
Dept: RHEUMATOLOGY | Facility: CLINIC | Age: 52
End: 2022-03-31

## 2022-03-31 DIAGNOSIS — R70.0 ELEVATED SED RATE: Primary | ICD-10-CM

## 2022-03-31 LAB
CCP IGA+IGG SERPL IA-ACNC: 8 UNITS (ref 0–19)
CRP SERPL-MCNC: 5 MG/L (ref 0–10)
ENA SS-A AB SER-ACNC: 0.2 AI (ref 0–0.9)
ENA SS-B AB SER-ACNC: <0.2 AI (ref 0–0.9)
ERYTHROCYTE [SEDIMENTATION RATE] IN BLOOD BY WESTERGREN METHOD: 65 MM/HR (ref 0–30)
HLA-B27 QL NAA+PROBE: NEGATIVE
RHEUMATOID FACT SERPL-ACNC: <10 IU/ML
URATE SERPL-MCNC: 7.1 MG/DL (ref 3.8–8.4)

## 2022-03-31 NOTE — TELEPHONE ENCOUNTER
Please let him know the x-rays of the knees and hips showed wear and tear arthritis and he should follow up with Orthopedics for this  The blood work for autoimmune types of arthritis was normal but 1 of the tests showed an elevation in the inflammation marker  I would like to repeat this in 1 month and can mail order to him, thanks

## 2022-04-06 ENCOUNTER — TELEPHONE (OUTPATIENT)
Dept: GASTROENTEROLOGY | Facility: AMBULARY SURGERY CENTER | Age: 52
End: 2022-04-06

## 2022-04-22 ENCOUNTER — PROCEDURE VISIT (OUTPATIENT)
Dept: PAIN MEDICINE | Facility: CLINIC | Age: 52
End: 2022-04-22
Payer: COMMERCIAL

## 2022-04-22 DIAGNOSIS — M25.552 LEFT HIP PAIN: Primary | ICD-10-CM

## 2022-04-22 DIAGNOSIS — M16.12 PRIMARY OSTEOARTHRITIS OF LEFT HIP: ICD-10-CM

## 2022-04-22 PROCEDURE — 20611 DRAIN/INJ JOINT/BURSA W/US: CPT | Performed by: ANESTHESIOLOGY

## 2022-04-22 RX ORDER — BUPIVACAINE HYDROCHLORIDE 2.5 MG/ML
10 INJECTION, SOLUTION EPIDURAL; INFILTRATION; INTRACAUDAL ONCE
Status: COMPLETED | OUTPATIENT
Start: 2022-04-22 | End: 2022-04-22

## 2022-04-22 RX ORDER — METHYLPREDNISOLONE ACETATE 80 MG/ML
80 INJECTION, SUSPENSION INTRA-ARTICULAR; INTRALESIONAL; INTRAMUSCULAR; SOFT TISSUE ONCE
Status: COMPLETED | OUTPATIENT
Start: 2022-04-22 | End: 2022-04-22

## 2022-04-22 RX ADMIN — METHYLPREDNISOLONE ACETATE 80 MG: 80 INJECTION, SUSPENSION INTRA-ARTICULAR; INTRALESIONAL; INTRAMUSCULAR; SOFT TISSUE at 15:22

## 2022-04-22 RX ADMIN — BUPIVACAINE HYDROCHLORIDE 10 ML: 2.5 INJECTION, SOLUTION EPIDURAL; INFILTRATION; INTRACAUDAL at 15:21

## 2022-04-22 NOTE — PROGRESS NOTES
ATTENDING PHYSICIAN:  Amna Mcgowan MD      PREPROCEDURE DIAGNOSIS:  Left hip pain  POSTPROCEDURE DIAGNOSIS: Left hip pain  PROCEDURE: Left intraarticular hip injection under ultrasound guidance  ANESTHESIA:  Local     ESTIMATED BLOOD LOSS:  Minimal     COMPLICATIONS:  None  LOCATION:  [facility]        CONSENT:  Today's procedure, its risks, benefits, and alternatives were explained in detail to the patient  Risks include, but are not limited bleeding, infection, hematoma formation, abscess formation, weakness, nerve irritation or damage, failure of the pain to improve, or potential worsening of the pain  The patient verbalized understanding and wished to proceed with the procedure  Written informed consent was thereby obtained  DESCRIPTION OF THE PROCEDURE:  After written informed consent was obtained, the patient was taken to the ultrasound suite and placed in the supine position  Anatomical landmarks were identified by way of ultrasound guidance  The patient's hip region was prepped using antiseptic solution and draped in the usual sterile fashion  Strict aseptic technique was utilized  A 25 gauge 2 inch needle was then advanced incrementally under ultrasound guidance towards the femoral neck until os was contacted and the joint space was entered  Subsequently, a solution consisting of 4 ml of 0 25% Bupivacaine mixed with 1 ml of Depo-Medrol 80 mg/ml was injected slowly  All needles were removed with the tips intact and hemostasis was maintained throughout  The patient tolerated the procedure well, and there were no apparent paresthesias or complications noted during or following this procedure  The antiseptic was wiped clean and Band-Aids were placed as appropriate    The patient was observed for an appropriate period of time during which the patient remained hemodynamically stable and neurovascularly intact, as the patient was prior to the procedure  The patient was subsequently discharged to home in stable condition with supervision  The patient was instructed to call the office in a few days for an update  Discharge instructions were provided  I was present and participated in all key and critical portions of this procedure      Magdalena Hogan MD  4/22/2022  3:16 PM

## 2022-05-10 ENCOUNTER — OFFICE VISIT (OUTPATIENT)
Dept: FAMILY MEDICINE CLINIC | Facility: CLINIC | Age: 52
End: 2022-05-10
Payer: COMMERCIAL

## 2022-05-10 VITALS
RESPIRATION RATE: 16 BRPM | BODY MASS INDEX: 32.96 KG/M2 | DIASTOLIC BLOOD PRESSURE: 70 MMHG | HEIGHT: 63 IN | SYSTOLIC BLOOD PRESSURE: 134 MMHG | HEART RATE: 105 BPM | OXYGEN SATURATION: 98 % | WEIGHT: 186 LBS | TEMPERATURE: 98.6 F

## 2022-05-10 DIAGNOSIS — Z12.11 COLON CANCER SCREENING: ICD-10-CM

## 2022-05-10 DIAGNOSIS — I10 BENIGN ESSENTIAL HYPERTENSION: ICD-10-CM

## 2022-05-10 DIAGNOSIS — Z00.00 HEALTHCARE MAINTENANCE: Primary | ICD-10-CM

## 2022-05-10 PROCEDURE — 99396 PREV VISIT EST AGE 40-64: CPT | Performed by: FAMILY MEDICINE

## 2022-05-10 PROCEDURE — 3725F SCREEN DEPRESSION PERFORMED: CPT | Performed by: FAMILY MEDICINE

## 2022-05-10 PROCEDURE — 3008F BODY MASS INDEX DOCD: CPT | Performed by: FAMILY MEDICINE

## 2022-05-10 PROCEDURE — 3075F SYST BP GE 130 - 139MM HG: CPT | Performed by: FAMILY MEDICINE

## 2022-05-10 PROCEDURE — 3078F DIAST BP <80 MM HG: CPT | Performed by: FAMILY MEDICINE

## 2022-05-10 PROCEDURE — 1036F TOBACCO NON-USER: CPT | Performed by: FAMILY MEDICINE

## 2022-05-10 RX ORDER — AMLODIPINE BESYLATE 5 MG/1
5 TABLET ORAL DAILY
Qty: 90 TABLET | Refills: 1 | Status: SHIPPED | OUTPATIENT
Start: 2022-05-10

## 2022-05-10 NOTE — PROGRESS NOTES
Assessment/Plan:    No problem-specific Assessment & Plan notes found for this encounter  cpe  htn stable  labslip reprinted  F/u q6m     Diagnoses and all orders for this visit:    Healthcare maintenance    Colon cancer screening  -     Ambulatory referral for colonoscopy; Future    Benign essential hypertension  -     amLODIPine (NORVASC) 5 mg tablet; Take 1 tablet (5 mg total) by mouth daily        Return in about 6 months (around 11/10/2022) for Recheck  Subjective:      Patient ID: Miguelina Vyas is a 50 y o  male  Chief Complaint   Patient presents with    Physical Exam     mfcma       HPI    On bp meds  No edema  Has carbs  Limits salt  Lean meat  No exercise program  Father  recently from Lymphoma  Did not do labs    The following portions of the patient's history were reviewed and updated as appropriate: allergies, current medications, past family history, past medical history, past social history, past surgical history and problem list     Review of Systems   Constitutional: Negative for fever  Respiratory: Negative for shortness of breath  Current Outpatient Medications   Medication Sig Dispense Refill    amLODIPine (NORVASC) 5 mg tablet Take 1 tablet (5 mg total) by mouth daily 90 tablet 1     No current facility-administered medications for this visit  Objective:    /70   Pulse 105   Temp 98 6 °F (37 °C)   Resp 16   Ht 5' 3" (1 6 m)   Wt 84 4 kg (186 lb)   SpO2 98%   BMI 32 95 kg/m²        Physical Exam  Vitals and nursing note reviewed  Constitutional:       General: He is not in acute distress  Appearance: He is well-developed  He is not ill-appearing  HENT:      Head: Normocephalic  Right Ear: Tympanic membrane normal       Left Ear: Tympanic membrane normal    Eyes:      General: No scleral icterus  Conjunctiva/sclera: Conjunctivae normal    Neck:      Vascular: No carotid bruit     Cardiovascular:      Rate and Rhythm: Normal rate and regular rhythm  Heart sounds: No murmur heard  Pulmonary:      Effort: Pulmonary effort is normal  No respiratory distress  Breath sounds: No wheezing  Abdominal:      General: There is no distension  Palpations: Abdomen is soft  Tenderness: There is no abdominal tenderness  Musculoskeletal:         General: No deformity  Cervical back: Neck supple  Skin:     General: Skin is warm and dry  Coloration: Skin is not jaundiced or pale  Neurological:      Mental Status: He is alert  Motor: No weakness  Gait: Gait normal    Psychiatric:         Mood and Affect: Mood normal          Behavior: Behavior normal          Thought Content: Thought content normal          BMI Counseling: Body mass index is 32 95 kg/m²  The BMI is above normal  Nutrition recommendations include decreasing portion sizes and moderation in carbohydrate intake  Exercise recommendations include exercising 3-5 times per week  No pharmacotherapy was ordered  Rationale for BMI follow-up plan is due to patient being overweight or obese  Depression Screening and Follow-up Plan: Patient was screened for depression during today's encounter  They screened negative with a PHQ-2 score of 0             Serge Kaur DO

## 2022-10-11 DIAGNOSIS — I10 BENIGN ESSENTIAL HYPERTENSION: ICD-10-CM

## 2022-10-11 PROBLEM — Z12.11 COLON CANCER SCREENING: Status: RESOLVED | Noted: 2022-05-10 | Resolved: 2022-10-11

## 2022-10-11 RX ORDER — AMLODIPINE BESYLATE 5 MG/1
5 TABLET ORAL DAILY
Qty: 90 TABLET | Refills: 1 | Status: SHIPPED | OUTPATIENT
Start: 2022-10-11

## 2022-11-05 DIAGNOSIS — I10 BENIGN ESSENTIAL HYPERTENSION: ICD-10-CM

## 2022-11-07 RX ORDER — AMLODIPINE BESYLATE 5 MG/1
TABLET ORAL
Qty: 90 TABLET | Refills: 1 | Status: SHIPPED | OUTPATIENT
Start: 2022-11-07

## 2023-05-22 DIAGNOSIS — I10 BENIGN ESSENTIAL HYPERTENSION: ICD-10-CM

## 2023-05-22 RX ORDER — AMLODIPINE BESYLATE 5 MG/1
TABLET ORAL
Qty: 90 TABLET | Refills: 1 | Status: SHIPPED | OUTPATIENT
Start: 2023-05-22

## 2023-06-06 ENCOUNTER — OFFICE VISIT (OUTPATIENT)
Dept: FAMILY MEDICINE CLINIC | Facility: CLINIC | Age: 53
End: 2023-06-06
Payer: COMMERCIAL

## 2023-06-06 VITALS
OXYGEN SATURATION: 97 % | WEIGHT: 180 LBS | HEIGHT: 64 IN | BODY MASS INDEX: 30.73 KG/M2 | SYSTOLIC BLOOD PRESSURE: 138 MMHG | RESPIRATION RATE: 16 BRPM | TEMPERATURE: 98 F | HEART RATE: 93 BPM | DIASTOLIC BLOOD PRESSURE: 80 MMHG

## 2023-06-06 DIAGNOSIS — Z12.11 COLON CANCER SCREENING: ICD-10-CM

## 2023-06-06 DIAGNOSIS — Z13.6 SCREENING FOR CARDIOVASCULAR, RESPIRATORY, AND GENITOURINARY DISEASES: ICD-10-CM

## 2023-06-06 DIAGNOSIS — Z13.83 SCREENING FOR CARDIOVASCULAR, RESPIRATORY, AND GENITOURINARY DISEASES: ICD-10-CM

## 2023-06-06 DIAGNOSIS — I10 BENIGN ESSENTIAL HYPERTENSION: ICD-10-CM

## 2023-06-06 DIAGNOSIS — Z00.00 HEALTHCARE MAINTENANCE: Primary | ICD-10-CM

## 2023-06-06 DIAGNOSIS — Z12.5 PROSTATE CANCER SCREENING: ICD-10-CM

## 2023-06-06 DIAGNOSIS — Z13.1 SCREENING FOR DIABETES MELLITUS (DM): ICD-10-CM

## 2023-06-06 DIAGNOSIS — Z13.89 SCREENING FOR CARDIOVASCULAR, RESPIRATORY, AND GENITOURINARY DISEASES: ICD-10-CM

## 2023-06-06 DIAGNOSIS — M79.10 MYALGIA: ICD-10-CM

## 2023-06-06 PROCEDURE — 99396 PREV VISIT EST AGE 40-64: CPT | Performed by: FAMILY MEDICINE

## 2023-06-06 NOTE — PROGRESS NOTES
"Assessment/Plan:    No problem-specific Assessment & Plan notes found for this encounter  cpe  htn stable  Declines CHARITY    A lung CAT scan screening for lung cancer could be offered to you yearly if you choose  Diagnoses and all orders for this visit:    Healthcare maintenance    Benign essential hypertension    Colon cancer screening    Screening for cardiovascular, respiratory, and genitourinary diseases  -     Lipid Panel with Direct LDL reflex; Future    Screening for diabetes mellitus (DM)  -     Comprehensive metabolic panel; Future    Prostate cancer screening  -     PSA, ultrasensitive; Future    Myalgia  -     Ambulatory referral to Physical Therapy; Future              Return in about 6 months (around 12/6/2023) for Recheck  Subjective:      Patient ID: Aris Nam is a 48 y o  male  Chief Complaint   Patient presents with   • Physical Exam     Smith Cao MA        HPI  Father with lymphoma at 72, also htn, dm  Taking his bp meds  No exercise program  Physical job  Drinks water mostly  No sugar  Has carbs    30 pack year hx  Quit 2 y ago    The following portions of the patient's history were reviewed and updated as appropriate: allergies, current medications, past family history, past medical history, past social history, past surgical history and problem list     Review of Systems   Respiratory: Negative for shortness of breath  Cardiovascular: Negative for chest pain  Current Outpatient Medications   Medication Sig Dispense Refill   • amLODIPine (NORVASC) 5 mg tablet TAKE 1 TABLET DAILY 90 tablet 1     No current facility-administered medications for this visit  Objective:    /80   Pulse 93   Temp 98 °F (36 7 °C)   Resp 16   Ht 5' 4\" (1 626 m)   Wt 81 6 kg (180 lb)   SpO2 97%   BMI 30 90 kg/m²        Physical Exam  Vitals and nursing note reviewed  Constitutional:       General: He is not in acute distress  Appearance: He is well-developed   He " is not ill-appearing  HENT:      Head: Normocephalic  Right Ear: Tympanic membrane normal       Left Ear: Tympanic membrane normal    Eyes:      General: No scleral icterus  Conjunctiva/sclera: Conjunctivae normal    Neck:      Vascular: No carotid bruit  Cardiovascular:      Rate and Rhythm: Normal rate and regular rhythm  Heart sounds: No murmur heard  Pulmonary:      Effort: Pulmonary effort is normal  No respiratory distress  Breath sounds: No wheezing  Abdominal:      General: There is no distension  Palpations: Abdomen is soft  Tenderness: There is no abdominal tenderness  Hernia: No hernia is present  Musculoskeletal:         General: No deformity  Cervical back: Neck supple  Skin:     General: Skin is warm and dry  Coloration: Skin is not jaundiced or pale  Neurological:      Mental Status: He is alert  Motor: No weakness  Gait: Gait normal    Psychiatric:         Mood and Affect: Mood normal          Behavior: Behavior normal          Thought Content: Thought content normal          BMI Counseling: Body mass index is 30 9 kg/m²  The BMI is above normal  Nutrition recommendations include decreasing portion sizes and moderation in carbohydrate intake  Exercise recommendations include exercising 3-5 times per week  No pharmacotherapy was ordered  Rationale for BMI follow-up plan is due to patient being overweight or obese  Depression Screening and Follow-up Plan: Patient was screened for depression during today's encounter  They screened negative with a PHQ-2 score of 1             Cheryle Pass, DO

## 2023-06-27 ENCOUNTER — OFFICE VISIT (OUTPATIENT)
Dept: FAMILY MEDICINE CLINIC | Facility: CLINIC | Age: 53
End: 2023-06-27
Payer: COMMERCIAL

## 2023-06-27 VITALS
DIASTOLIC BLOOD PRESSURE: 80 MMHG | RESPIRATION RATE: 18 BRPM | HEIGHT: 64 IN | OXYGEN SATURATION: 99 % | HEART RATE: 92 BPM | BODY MASS INDEX: 30.56 KG/M2 | SYSTOLIC BLOOD PRESSURE: 136 MMHG | WEIGHT: 179 LBS | TEMPERATURE: 98 F

## 2023-06-27 DIAGNOSIS — R42 DIZZINESS: Primary | ICD-10-CM

## 2023-06-27 PROCEDURE — 99213 OFFICE O/P EST LOW 20 MIN: CPT | Performed by: FAMILY MEDICINE

## 2023-06-27 RX ORDER — MECLIZINE HCL 12.5 MG/1
12.5 TABLET ORAL 3 TIMES DAILY PRN
Qty: 30 TABLET | Refills: 0 | Status: SHIPPED | OUTPATIENT
Start: 2023-06-27

## 2023-06-27 NOTE — PROGRESS NOTES
"Name: Kina Carrera      : 1970      MRN: [id number]  Encounter Provider: Venkat Gautam MD  Encounter Date: 2023   Encounter department: [address] [place] [address]  Dizziness  Comments:  Possible vertigo  He will get blood work done  CMP, CBC pending  Vitals stable  Counseled on hydration  Will have him see PT for possible BPPV treatment   Orders:  -     CBC and Platelet; Future  -     meclizine (ANTIVERT) 12 5 MG tablet; Take 1 tablet (12 5 mg total) by mouth 3 (three) times a day as needed for dizziness  -     Ambulatory Referral to Physical Therapy; Future           Subjective      Dizziness  This is a new problem  The current episode started 1 to 4 weeks ago  The problem occurs constantly  Pertinent negatives include no abdominal pain, anorexia, arthralgias, change in bowel habit, chest pain, chills, congestion, coughing, diaphoresis, fatigue, fever, headaches, joint swelling, myalgias, nausea, neck pain, numbness, rash, sore throat, swollen glands, urinary symptoms, vertigo, visual change, vomiting or weakness  Nothing aggravates the symptoms  He has tried acetaminophen for the symptoms  Review of Systems   Constitutional: Negative for chills, diaphoresis, fatigue and fever  HENT: Negative for congestion and sore throat  Respiratory: Negative for cough  Cardiovascular: Negative for chest pain  Gastrointestinal: Negative for abdominal pain, anorexia, change in bowel habit, nausea and vomiting  Musculoskeletal: Negative for arthralgias, joint swelling, myalgias and neck pain  Skin: Negative for rash  Neurological: Positive for dizziness  Negative for vertigo, weakness, numbness and headaches         Current Outpatient Medications on File Prior to Visit   Medication Sig   • amLODIPine (NORVASC) 5 mg tablet TAKE 1 TABLET DAILY       Objective     /80   Pulse 92   Temp 98 °F (36 7 °C)   Resp 18   Ht 5' 4\" (1 626 m)   Wt 81 2 kg (179 lb)   SpO2 " 99%   BMI 30 73 kg/m²     Physical Exam  Constitutional:       General: He is not in acute distress  Appearance: He is well-developed  He is not diaphoretic  HENT:      Head: Normocephalic and atraumatic  Right Ear: Tympanic membrane and ear canal normal  No drainage, swelling or tenderness  No middle ear effusion  Tympanic membrane is not erythematous  Left Ear: Tympanic membrane and ear canal normal  No drainage, swelling or tenderness  No middle ear effusion  Tympanic membrane is not erythematous  Nose: No congestion or rhinorrhea  Mouth/Throat:      Mouth: Mucous membranes are moist  No oral lesions  Pharynx: No pharyngeal swelling, oropharyngeal exudate, posterior oropharyngeal erythema or uvula swelling  Eyes:      General: No scleral icterus  Right eye: No discharge  Left eye: No discharge  Conjunctiva/sclera: Conjunctivae normal    Cardiovascular:      Rate and Rhythm: Normal rate and regular rhythm  Pulses: Normal pulses  Pulmonary:      Effort: Pulmonary effort is normal  No respiratory distress  Breath sounds: Normal breath sounds  No stridor  No wheezing, rhonchi or rales  Chest:      Chest wall: No tenderness  Musculoskeletal:         General: Normal range of motion  Cervical back: Normal range of motion  Skin:     General: Skin is warm  Neurological:      Mental Status: He is alert and oriented to person, place, and time  Psychiatric:         Mood and Affect: Mood normal          Behavior: Behavior normal          Thought Content:  Thought content normal          Judgment: Judgment normal        Elle Helton MD

## 2023-07-02 LAB
ALBUMIN SERPL-MCNC: 4.8 G/DL (ref 3.8–4.9)
ALBUMIN/GLOB SERPL: 2.4 {RATIO} (ref 1.2–2.2)
ALP SERPL-CCNC: 46 IU/L (ref 44–121)
ALT SERPL-CCNC: 25 IU/L (ref 0–44)
AST SERPL-CCNC: 29 IU/L (ref 0–40)
BILIRUB SERPL-MCNC: 0.5 MG/DL (ref 0–1.2)
BUN SERPL-MCNC: 16 MG/DL (ref 6–24)
BUN/CREAT SERPL: 20 (ref 9–20)
CALCIUM SERPL-MCNC: 9.3 MG/DL (ref 8.7–10.2)
CHLORIDE SERPL-SCNC: 102 MMOL/L (ref 96–106)
CHOLEST SERPL-MCNC: 220 MG/DL (ref 100–199)
CO2 SERPL-SCNC: 22 MMOL/L (ref 20–29)
CREAT SERPL-MCNC: 0.82 MG/DL (ref 0.76–1.27)
EGFR: 106 ML/MIN/1.73
GLOBULIN SER-MCNC: 2 G/DL (ref 1.5–4.5)
GLUCOSE SERPL-MCNC: 95 MG/DL (ref 70–99)
HDLC SERPL-MCNC: 55 MG/DL
LDLC SERPL CALC-MCNC: 153 MG/DL (ref 0–99)
MICRODELETION SYND BLD/T FISH: NORMAL
POTASSIUM SERPL-SCNC: 4.4 MMOL/L (ref 3.5–5.2)
PROT SERPL-MCNC: 6.8 G/DL (ref 6–8.5)
PSA SERPL DL<=0.01 NG/ML-MCNC: 1.89 NG/ML (ref 0–4)
SODIUM SERPL-SCNC: 140 MMOL/L (ref 134–144)
TRIGL SERPL-MCNC: 69 MG/DL (ref 0–149)

## 2023-08-05 PROBLEM — Z12.11 COLON CANCER SCREENING: Status: RESOLVED | Noted: 2023-06-06 | Resolved: 2023-08-05

## 2023-11-20 ENCOUNTER — TELEPHONE (OUTPATIENT)
Age: 53
End: 2023-11-20

## 2023-11-20 DIAGNOSIS — I10 BENIGN ESSENTIAL HYPERTENSION: ICD-10-CM

## 2023-11-20 RX ORDER — AMLODIPINE BESYLATE 5 MG/1
5 TABLET ORAL DAILY
Qty: 90 TABLET | Refills: 1 | Status: SHIPPED | OUTPATIENT
Start: 2023-11-20

## 2023-11-20 NOTE — TELEPHONE ENCOUNTER
Pt wife was calling to get a refill for the pt for the Amlodipine (Norvasc) 5 mg stated it needed to be ordered through [facility] otherwise their insurance won't cover the medication. Pt only has 10 tablets left.

## 2023-11-20 NOTE — TELEPHONE ENCOUNTER
Pt aware we are no longer open until 8pm we close at 7pm so his apt needs to stay at [address] where it is. Pt is ok with that.  Nfa

## 2023-11-20 NOTE — TELEPHONE ENCOUNTER
Pt wife was calling to schedule pt a visit for his physical the pt was scheduled for 1/2/24 at 6 pm but pt would like to be seen on a Tuesday at 7 pm or the latest time available. Doctor has same days towards to the end of the day I do not have privilege to override. Pt mentioned being seen by the doctor for that time before please advise if needed, thank you.

## 2023-12-05 ENCOUNTER — OFFICE VISIT (OUTPATIENT)
Dept: FAMILY MEDICINE CLINIC | Facility: CLINIC | Age: 53
End: 2023-12-05
Payer: COMMERCIAL

## 2023-12-05 VITALS
HEIGHT: 64 IN | BODY MASS INDEX: 31.07 KG/M2 | RESPIRATION RATE: 16 BRPM | WEIGHT: 182 LBS | SYSTOLIC BLOOD PRESSURE: 122 MMHG | HEART RATE: 89 BPM | TEMPERATURE: 98.2 F | OXYGEN SATURATION: 98 % | DIASTOLIC BLOOD PRESSURE: 82 MMHG

## 2023-12-05 DIAGNOSIS — I10 BENIGN ESSENTIAL HYPERTENSION: Primary | ICD-10-CM

## 2023-12-05 DIAGNOSIS — Z13.83 SCREENING FOR CARDIOVASCULAR, RESPIRATORY, AND GENITOURINARY DISEASES: ICD-10-CM

## 2023-12-05 DIAGNOSIS — Z12.5 PROSTATE CANCER SCREENING: ICD-10-CM

## 2023-12-05 DIAGNOSIS — Z12.11 COLON CANCER SCREENING: ICD-10-CM

## 2023-12-05 DIAGNOSIS — Z13.6 SCREENING FOR CARDIOVASCULAR, RESPIRATORY, AND GENITOURINARY DISEASES: ICD-10-CM

## 2023-12-05 DIAGNOSIS — E66.9 OBESITY (BMI 30-39.9): ICD-10-CM

## 2023-12-05 DIAGNOSIS — Z13.89 SCREENING FOR CARDIOVASCULAR, RESPIRATORY, AND GENITOURINARY DISEASES: ICD-10-CM

## 2023-12-05 DIAGNOSIS — Z13.1 SCREENING FOR DIABETES MELLITUS (DM): ICD-10-CM

## 2023-12-05 PROCEDURE — 99213 OFFICE O/P EST LOW 20 MIN: CPT | Performed by: FAMILY MEDICINE

## 2023-12-05 NOTE — PROGRESS NOTES
Assessment/Plan:    No problem-specific Assessment & Plan notes found for this encounter. Htn stable  Continue meds and q6m f/u    Bmi noted  Obesity but has muscle mass  Try to do more exercise and reduce core size    Tob hx of use  Quit 2y ago  LDCT indications reviewed but declined     Diagnoses and all orders for this visit:    Benign essential hypertension    Colon cancer screening  -     Ambulatory referral to Gastroenterology; Future    Prostate cancer screening  -     PSA, Total Screen; Future    Screening for cardiovascular, respiratory, and genitourinary diseases  -     Lipid Panel with Direct LDL reflex; Future    Screening for diabetes mellitus (DM)  -     Comprehensive metabolic panel; Future    BMI 31.0-31.9,adult    Obesity (BMI 30-39. 9)        Return in about 7 months (around 7/17/2024) for Annual physical.    Subjective:      Patient ID: Benjamin Cutler is a 48 y.o. male. Chief Complaint   Patient presents with    Follow-up     BP management  Neville Amos MA        HPI  Needs colonoscopy  Taking bp meds  No exercise but active at work  Mostly water intake  About 32oz water  Quit tob  LDCT discussed    The following portions of the patient's history were reviewed and updated as appropriate: allergies, current medications, past family history, past medical history, past social history, past surgical history and problem list.    Review of Systems   Respiratory:  Negative for shortness of breath. Cardiovascular:  Negative for chest pain and leg swelling. Current Outpatient Medications   Medication Sig Dispense Refill    amLODIPine (NORVASC) 5 mg tablet Take 1 tablet (5 mg total) by mouth daily 90 tablet 1     No current facility-administered medications for this visit. Objective:    /82   Pulse 89   Temp 98.2 °F (36.8 °C)   Resp 16   Ht 5' 4" (1.626 m)   Wt 82.6 kg (182 lb)   SpO2 98%   BMI 31.24 kg/m²        Physical Exam  Vitals and nursing note reviewed. Constitutional:       General: He is not in acute distress. Appearance: He is well-developed. He is not ill-appearing. HENT:      Head: Normocephalic. Right Ear: Tympanic membrane normal.      Left Ear: Tympanic membrane normal.   Eyes:      General: No scleral icterus. Conjunctiva/sclera: Conjunctivae normal.   Cardiovascular:      Rate and Rhythm: Normal rate and regular rhythm. Heart sounds: No murmur heard. Pulmonary:      Effort: Pulmonary effort is normal. No respiratory distress. Breath sounds: No wheezing. Abdominal:      Palpations: Abdomen is soft. Musculoskeletal:         General: No deformity. Cervical back: Neck supple. Right lower leg: No edema. Left lower leg: No edema. Skin:     General: Skin is warm and dry. Coloration: Skin is not pale. Neurological:      Mental Status: He is alert. Psychiatric:         Behavior: Behavior normal.         Thought Content:  Thought content normal.                Sami Govern, DO

## 2024-02-21 PROBLEM — Z13.89 SCREENING FOR CARDIOVASCULAR, RESPIRATORY, AND GENITOURINARY DISEASES: Status: RESOLVED | Noted: 2018-05-15 | Resolved: 2024-02-21

## 2024-02-21 PROBLEM — Z12.5 PROSTATE CANCER SCREENING: Status: RESOLVED | Noted: 2018-05-15 | Resolved: 2024-02-21

## 2024-02-21 PROBLEM — Z13.6 SCREENING FOR CARDIOVASCULAR, RESPIRATORY, AND GENITOURINARY DISEASES: Status: RESOLVED | Noted: 2018-05-15 | Resolved: 2024-02-21

## 2024-02-21 PROBLEM — Z13.1 SCREENING FOR DIABETES MELLITUS (DM): Status: RESOLVED | Noted: 2018-05-15 | Resolved: 2024-02-21

## 2024-02-21 PROBLEM — Z13.83 SCREENING FOR CARDIOVASCULAR, RESPIRATORY, AND GENITOURINARY DISEASES: Status: RESOLVED | Noted: 2018-05-15 | Resolved: 2024-02-21

## 2024-02-21 PROBLEM — Z00.00 HEALTHCARE MAINTENANCE: Status: RESOLVED | Noted: 2018-10-23 | Resolved: 2024-02-21

## 2024-03-15 ENCOUNTER — HOSPITAL ENCOUNTER (EMERGENCY)
Facility: HOSPITAL | Age: 54
Discharge: HOME | End: 2024-03-15
Attending: EMERGENCY MEDICINE
Payer: COMMERCIAL

## 2024-03-15 ENCOUNTER — APPOINTMENT (EMERGENCY)
Dept: RADIOLOGY | Facility: HOSPITAL | Age: 54
End: 2024-03-15
Payer: COMMERCIAL

## 2024-03-15 VITALS
WEIGHT: 187 LBS | HEART RATE: 82 BPM | DIASTOLIC BLOOD PRESSURE: 65 MMHG | TEMPERATURE: 98.7 F | RESPIRATION RATE: 18 BRPM | BODY MASS INDEX: 32.1 KG/M2 | OXYGEN SATURATION: 97 % | SYSTOLIC BLOOD PRESSURE: 119 MMHG

## 2024-03-15 DIAGNOSIS — R10.9 ABDOMINAL PAIN: Primary | ICD-10-CM

## 2024-03-15 DIAGNOSIS — K57.92 DIVERTICULITIS: ICD-10-CM

## 2024-03-15 LAB
ALBUMIN SERPL BCP-MCNC: 4.4 G/DL (ref 3.5–5)
ALP SERPL-CCNC: 44 U/L (ref 34–104)
ALT SERPL W P-5'-P-CCNC: 21 U/L (ref 7–52)
ANION GAP SERPL CALCULATED.3IONS-SCNC: 7 MMOL/L (ref 4–13)
AST SERPL W P-5'-P-CCNC: 19 U/L (ref 13–39)
BASOPHILS # BLD AUTO: 0.06 THOUSANDS/ÂΜL (ref 0–0.1)
BASOPHILS NFR BLD AUTO: 1 % (ref 0–1)
BILIRUB SERPL-MCNC: 0.8 MG/DL (ref 0.2–1)
BUN SERPL-MCNC: 15 MG/DL (ref 5–25)
CALCIUM SERPL-MCNC: 9.4 MG/DL (ref 8.4–10.2)
CHLORIDE SERPL-SCNC: 103 MMOL/L (ref 96–108)
CO2 SERPL-SCNC: 26 MMOL/L (ref 21–32)
CREAT SERPL-MCNC: 0.86 MG/DL (ref 0.6–1.3)
EOSINOPHIL # BLD AUTO: 0.21 THOUSAND/ÂΜL (ref 0–0.61)
EOSINOPHIL NFR BLD AUTO: 2 % (ref 0–6)
ERYTHROCYTE [DISTWIDTH] IN BLOOD BY AUTOMATED COUNT: 12.9 % (ref 11.6–15.1)
GFR SERPL CREATININE-BSD FRML MDRD: 99 ML/MIN/1.73SQ M
GLUCOSE SERPL-MCNC: 102 MG/DL (ref 65–140)
HCT VFR BLD AUTO: 41.1 % (ref 36.5–49.3)
HGB BLD-MCNC: 13.8 G/DL (ref 12–17)
IMM GRANULOCYTES # BLD AUTO: 0.05 THOUSAND/UL (ref 0–0.2)
IMM GRANULOCYTES NFR BLD AUTO: 0 % (ref 0–2)
LIPASE SERPL-CCNC: 18 U/L (ref 11–82)
LYMPHOCYTES # BLD AUTO: 1.7 THOUSANDS/ÂΜL (ref 0.6–4.47)
LYMPHOCYTES NFR BLD AUTO: 15 % (ref 14–44)
MAGNESIUM SERPL-MCNC: 1.8 MG/DL (ref 1.9–2.7)
MCH RBC QN AUTO: 29.7 PG (ref 26.8–34.3)
MCHC RBC AUTO-ENTMCNC: 33.6 G/DL (ref 31.4–37.4)
MCV RBC AUTO: 89 FL (ref 82–98)
MONOCYTES # BLD AUTO: 1.31 THOUSAND/ÂΜL (ref 0.17–1.22)
MONOCYTES NFR BLD AUTO: 11 % (ref 4–12)
NEUTROPHILS # BLD AUTO: 8.34 THOUSANDS/ÂΜL (ref 1.85–7.62)
NEUTS SEG NFR BLD AUTO: 71 % (ref 43–75)
NRBC BLD AUTO-RTO: 0 /100 WBCS
PLATELET # BLD AUTO: 233 THOUSANDS/UL (ref 149–390)
PMV BLD AUTO: 9.4 FL (ref 8.9–12.7)
POTASSIUM SERPL-SCNC: 4.2 MMOL/L (ref 3.5–5.3)
PROT SERPL-MCNC: 7.3 G/DL (ref 6.4–8.4)
RBC # BLD AUTO: 4.64 MILLION/UL (ref 3.88–5.62)
SODIUM SERPL-SCNC: 136 MMOL/L (ref 135–147)
WBC # BLD AUTO: 11.67 THOUSAND/UL (ref 4.31–10.16)

## 2024-03-15 PROCEDURE — 99284 EMERGENCY DEPT VISIT MOD MDM: CPT

## 2024-03-15 PROCEDURE — 99285 EMERGENCY DEPT VISIT HI MDM: CPT | Performed by: EMERGENCY MEDICINE

## 2024-03-15 PROCEDURE — 85025 COMPLETE CBC W/AUTO DIFF WBC: CPT | Performed by: EMERGENCY MEDICINE

## 2024-03-15 PROCEDURE — 96374 THER/PROPH/DIAG INJ IV PUSH: CPT

## 2024-03-15 PROCEDURE — 80053 COMPREHEN METABOLIC PANEL: CPT | Performed by: EMERGENCY MEDICINE

## 2024-03-15 PROCEDURE — 74177 CT ABD & PELVIS W/CONTRAST: CPT

## 2024-03-15 PROCEDURE — 36415 COLL VENOUS BLD VENIPUNCTURE: CPT | Performed by: EMERGENCY MEDICINE

## 2024-03-15 PROCEDURE — 83690 ASSAY OF LIPASE: CPT | Performed by: EMERGENCY MEDICINE

## 2024-03-15 PROCEDURE — 96361 HYDRATE IV INFUSION ADD-ON: CPT

## 2024-03-15 PROCEDURE — 83735 ASSAY OF MAGNESIUM: CPT | Performed by: EMERGENCY MEDICINE

## 2024-03-15 RX ORDER — METRONIDAZOLE 500 MG/1
500 TABLET ORAL 3 TIMES DAILY
Qty: 30 TABLET | Refills: 0 | Status: SHIPPED | OUTPATIENT
Start: 2024-03-15 | End: 2024-03-25

## 2024-03-15 RX ORDER — METRONIDAZOLE 500 MG/1
500 TABLET ORAL ONCE
Status: COMPLETED | OUTPATIENT
Start: 2024-03-15 | End: 2024-03-15

## 2024-03-15 RX ORDER — LEVOFLOXACIN 500 MG/1
500 TABLET, FILM COATED ORAL ONCE
Status: COMPLETED | OUTPATIENT
Start: 2024-03-15 | End: 2024-03-15

## 2024-03-15 RX ORDER — KETOROLAC TROMETHAMINE 30 MG/ML
15 INJECTION, SOLUTION INTRAMUSCULAR; INTRAVENOUS ONCE
Status: COMPLETED | OUTPATIENT
Start: 2024-03-15 | End: 2024-03-15

## 2024-03-15 RX ORDER — LEVOFLOXACIN 500 MG/1
500 TABLET, FILM COATED ORAL DAILY
Qty: 10 TABLET | Refills: 0 | Status: SHIPPED | OUTPATIENT
Start: 2024-03-15 | End: 2024-03-25

## 2024-03-15 RX ORDER — MORPHINE SULFATE 4 MG/ML
4 INJECTION, SOLUTION INTRAMUSCULAR; INTRAVENOUS ONCE
Status: DISCONTINUED | OUTPATIENT
Start: 2024-03-15 | End: 2024-03-15 | Stop reason: HOSPADM

## 2024-03-15 RX ORDER — DICYCLOMINE HCL 20 MG
20 TABLET ORAL 2 TIMES DAILY
Qty: 20 TABLET | Refills: 0 | Status: SHIPPED | OUTPATIENT
Start: 2024-03-15

## 2024-03-15 RX ADMIN — KETOROLAC TROMETHAMINE 15 MG: 30 INJECTION, SOLUTION INTRAMUSCULAR; INTRAVENOUS at 08:13

## 2024-03-15 RX ADMIN — SODIUM CHLORIDE 1000 ML: 0.9 INJECTION, SOLUTION INTRAVENOUS at 08:11

## 2024-03-15 RX ADMIN — LEVOFLOXACIN 500 MG: 500 TABLET, FILM COATED ORAL at 09:33

## 2024-03-15 RX ADMIN — METRONIDAZOLE 500 MG: 500 TABLET ORAL at 09:33

## 2024-03-15 RX ADMIN — IOHEXOL 100 ML: 350 INJECTION, SOLUTION INTRAVENOUS at 08:41

## 2024-03-15 NOTE — ED PROVIDER NOTES
History  Chief Complaint   Patient presents with    Abdominal Pain     Patient c/o sharp left sided abdominal pain that started yesterday.  States pain worsens with deep breath.  Had chills last night, but no fever.  Denies nausea/vomiting/diarrhea.  Had a small BM today.       54-year-old male presents with left lower abdominal pain sharp continuous currently 6 out of 10 nothing makes it better or worse.  No nausea vomiting constipation diarrhea fevers this urgency frequency or any other symptom symptoms started yesterday no abdominal surgeries noted.  Never had a colonoscopy.      History provided by:  Patient   used: No        Prior to Admission Medications   Prescriptions Last Dose Informant Patient Reported? Taking?   amLODIPine (NORVASC) 5 mg tablet 3/15/2024  No Yes   Sig: Take 1 tablet (5 mg total) by mouth daily      Facility-Administered Medications: None       Past Medical History:   Diagnosis Date    Neoplasm of bone 2010    Nicotine dependence 2009    Vertigo     last taedcmbk05/09/13    Viral warts 2010       History reviewed. No pertinent surgical history.    Family History   Problem Relation Age of Onset    No Known Problems Mother     Hypertension Father     Lymphoma Father      I have reviewed and agree with the history as documented.    E-Cigarette/Vaping    E-Cigarette Use Never User      E-Cigarette/Vaping Substances    Nicotine No     THC No     CBD No     Flavoring No     Other No     Unknown No      Social History     Tobacco Use    Smoking status: Former     Current packs/day: 0.00     Average packs/day: 1 pack/day for 35.3 years (35.3 ttl pk-yrs)     Types: Cigarettes     Start date:      Quit date: 2021     Years since quittin.8    Smokeless tobacco: Never   Vaping Use    Vaping status: Never Used   Substance Use Topics    Alcohol use: Yes     Comment: social     Drug use: Never       Review of Systems   Constitutional: Negative.    HENT:  Negative.     Eyes: Negative.    Respiratory: Negative.     Cardiovascular: Negative.    Gastrointestinal:  Positive for abdominal pain.   Endocrine: Negative.    Genitourinary: Negative.    Musculoskeletal: Negative.    Skin: Negative.    Allergic/Immunologic: Negative.    Neurological: Negative.    Hematological: Negative.    Psychiatric/Behavioral: Negative.     All other systems reviewed and are negative.      Physical Exam  Physical Exam  Vitals and nursing note reviewed.   Constitutional:       Appearance: Normal appearance.   HENT:      Head: Normocephalic and atraumatic.      Nose: Nose normal.      Mouth/Throat:      Mouth: Mucous membranes are moist.   Eyes:      Extraocular Movements: Extraocular movements intact.      Pupils: Pupils are equal, round, and reactive to light.   Cardiovascular:      Rate and Rhythm: Normal rate and regular rhythm.   Pulmonary:      Effort: Pulmonary effort is normal.      Breath sounds: Normal breath sounds.   Abdominal:      General: Abdomen is flat. Bowel sounds are normal.      Palpations: Abdomen is soft.      Tenderness: There is abdominal tenderness in the left lower quadrant. There is no right CVA tenderness, left CVA tenderness, guarding or rebound. Negative signs include Rangel's sign, Rovsing's sign, McBurney's sign, psoas sign and obturator sign.   Musculoskeletal:         General: Normal range of motion.      Cervical back: Normal range of motion and neck supple.   Skin:     General: Skin is warm.      Capillary Refill: Capillary refill takes less than 2 seconds.   Neurological:      General: No focal deficit present.      Mental Status: He is alert and oriented to person, place, and time. Mental status is at baseline.   Psychiatric:         Mood and Affect: Mood normal.         Thought Content: Thought content normal.         Vital Signs  ED Triage Vitals [03/15/24 0749]   Temperature Pulse Respirations Blood Pressure SpO2   98.7 °F (37.1 °C) 96 18 149/90 98 %       Temp Source Heart Rate Source Patient Position - Orthostatic VS BP Location FiO2 (%)   Tympanic Monitor Sitting Right arm --      Pain Score       5           Vitals:    03/15/24 0749 03/15/24 0815 03/15/24 0915   BP: 149/90 128/83 119/65   Pulse: 96 84 82   Patient Position - Orthostatic VS: Sitting Lying Lying         Visual Acuity      ED Medications  Medications   sodium chloride 0.9 % bolus 1,000 mL (0 mL Intravenous Stopped 3/15/24 0934)   ketorolac (TORADOL) injection 15 mg (15 mg Intravenous Given 3/15/24 0813)   iohexol (OMNIPAQUE) 350 MG/ML injection (MULTI-DOSE) 100 mL (100 mL Intravenous Given 3/15/24 0841)   metroNIDAZOLE (FLAGYL) tablet 500 mg (500 mg Oral Given 3/15/24 0933)   levofloxacin (LEVAQUIN) tablet 500 mg (500 mg Oral Given 3/15/24 0933)       Diagnostic Studies  Results Reviewed       Procedure Component Value Units Date/Time    Comprehensive metabolic panel [[id number]] Collected: 03/15/24 0805    Lab Status: Final result Specimen: Blood from Arm, Left Updated: 03/15/24 0831     Sodium 136 mmol/L      Potassium 4.2 mmol/L      Chloride 103 mmol/L      CO2 26 mmol/L      ANION GAP 7 mmol/L      BUN 15 mg/dL      Creatinine 0.86 mg/dL      Glucose 102 mg/dL      Calcium 9.4 mg/dL      AST 19 U/L      ALT 21 U/L      Alkaline Phosphatase 44 U/L      Total Protein 7.3 g/dL      Albumin 4.4 g/dL      Total Bilirubin 0.80 mg/dL      eGFR 99 ml/min/1.73sq m     Narrative:      National Kidney Disease Foundation guidelines for Chronic Kidney Disease (CKD):     Stage 1 with normal or high GFR (GFR > 90 mL/min/1.73 square meters)    Stage 2 Mild CKD (GFR = 60-89 mL/min/1.73 square meters)    Stage 3A Moderate CKD (GFR = 45-59 mL/min/1.73 square meters)    Stage 3B Moderate CKD (GFR = 30-44 mL/min/1.73 square meters)    Stage 4 Severe CKD (GFR = 15-29 mL/min/1.73 square meters)    Stage 5 End Stage CKD (GFR <15 mL/min/1.73 square meters)  Note: GFR calculation is accurate only with a steady state  creatinine    Lipase [[id number]]  (Normal) Collected: 03/15/24 0805    Lab Status: Final result Specimen: Blood from Arm, Left Updated: 03/15/24 0831     Lipase 18 u/L     Magnesium [[id number]]  (Abnormal) Collected: 03/15/24 0805    Lab Status: Final result Specimen: Blood from Arm, Left Updated: 03/15/24 0831     Magnesium 1.8 mg/dL     CBC and differential [[id number]]  (Abnormal) Collected: 03/15/24 0805    Lab Status: Final result Specimen: Blood from Arm, Left Updated: 03/15/24 0814     WBC 11.67 Thousand/uL      RBC 4.64 Million/uL      Hemoglobin 13.8 g/dL      Hematocrit 41.1 %      MCV 89 fL      MCH 29.7 pg      MCHC 33.6 g/dL      RDW 12.9 %      MPV 9.4 fL      Platelets 233 Thousands/uL      nRBC 0 /100 WBCs      Neutrophils Relative 71 %      Immature Grans % 0 %      Lymphocytes Relative 15 %      Monocytes Relative 11 %      Eosinophils Relative 2 %      Basophils Relative 1 %      Neutrophils Absolute 8.34 Thousands/µL      Absolute Immature Grans 0.05 Thousand/uL      Absolute Lymphocytes 1.70 Thousands/µL      Absolute Monocytes 1.31 Thousand/µL      Eosinophils Absolute 0.21 Thousand/µL      Basophils Absolute 0.06 Thousands/µL                    CT abdomen pelvis with contrast   Final Result by Melquiades Avila MD (03/15 0917)      1.  Acute diverticulitis of the descending colon in the mid left abdomen. No abdominopelvic abscess   2.  1.9 cm left adrenal nodule. Although its imaging features are indeterminate, it does not have suspicious imaging features (heterogeneity, necrosis, irregular margins), therefore this is likely benign, and can be followed by non-contrast abdomen CT or    MRI in 12 months. Please note that for adrenal nodule > 1 cm,  biochemical evaluation is suggested to rule out functioning adenoma, if not already performed. Adrenal recommendation based on institutional consensus and Journal of American College of    Radiology 2017;14:3688-1983.         Workstation performed:  [id number]                    Procedures  Procedures         ED Course                                             Medical Decision Making  Patient evaluated with labs  imaging.  I reviewed the results and discussed them with the patient.  Patient discharged with appropriate instructions medications and follow-up.  Patient verbalized understanding had no further questions at the time of discharge.  Patient had stable vital signs and well-appearing at the time of discharge.    Problems Addressed:  Abdominal pain: acute illness or injury  Diverticulitis: acute illness or injury    Amount and/or Complexity of Data Reviewed  External Data Reviewed: notes.  Labs: ordered. Decision-making details documented in ED Course.  Radiology: ordered. Decision-making details documented in ED Course.    Risk  Prescription drug management.             Disposition  Final diagnoses:   Abdominal pain   Diverticulitis     Time reflects when diagnosis was documented in both MDM as applicable and the Disposition within this note       Time User Action Codes Description Comment    3/15/2024  9:26 AM Edilma Godinez [R10.9] Abdominal pain     3/15/2024  9:26 AM Edilma Godinez [K57.92] Diverticulitis           ED Disposition       ED Disposition   Discharge    Condition   Stable    Date/Time   Fri Mar 15, 2024  9:25 AM    Comment   Elieser Montoya discharge to home/self care.                   Follow-up Information       Follow up With Specialties Details Why Contact Info Additional Information    Ward Duran,  Family Medicine Schedule an appointment as soon as possible for a visit   [address] [facility]  [address]  [facility] [address]  [phone]       [facility] Emergency Medicine  If symptoms worsen [address] [facility] [address]  [phone] [facility], [address] [place] [address], [phone]    Castro Padilla MD  Gastroenterology   [address]  [facility] [address]  [phone]               Discharge Medication List as of 3/15/2024  9:28 AM        START taking these medications    Details   dicyclomine (BENTYL) 20 mg tablet Take 1 tablet (20 mg total) by mouth 2 (two) times a day, Starting Fri 3/15/2024, Normal      levofloxacin (LEVAQUIN) 500 mg tablet Take 1 tablet (500 mg total) by mouth daily for 10 days, Starting Fri 3/15/2024, Until Mon 3/25/2024, Normal      metroNIDAZOLE (FLAGYL) 500 mg tablet Take 1 tablet (500 mg total) by mouth 3 (three) times a day for 10 days, Starting Fri 3/15/2024, Until Mon 3/25/2024, Normal           CONTINUE these medications which have NOT CHANGED    Details   amLODIPine (NORVASC) 5 mg tablet Take 1 tablet (5 mg total) by mouth daily, Starting Mon 11/20/2023, Normal             No discharge procedures on file.    PDMP Review       None            ED Provider  Electronically Signed by             Edilma Godinez DO  03/15/24 1420

## 2024-03-15 NOTE — DISCHARGE INSTRUCTIONS
Return to the ED for worsening symptoms of abdominal pain,vomiting, fevers in 24-48 hours  Follow up with GI    Incidental finding: follow up with your doctor    1.9 cm left adrenal nodule. Although its imaging features are indeterminate, it does not have suspicious imaging features (heterogeneity, necrosis, irregular margins), therefore this is likely benign, and can be followed by non-contrast abdomen CT or  MRI in 12 months. Please note that for adrenal nodule > 1 cm,  biochemical evaluation is suggested to rule out functioning adenoma, if not already performed.

## 2024-05-02 ENCOUNTER — OFFICE VISIT (OUTPATIENT)
Dept: GASTROENTEROLOGY | Facility: CLINIC | Age: 54
End: 2024-05-02
Payer: COMMERCIAL

## 2024-05-02 VITALS
SYSTOLIC BLOOD PRESSURE: 136 MMHG | DIASTOLIC BLOOD PRESSURE: 86 MMHG | WEIGHT: 185 LBS | HEIGHT: 64 IN | HEART RATE: 92 BPM | BODY MASS INDEX: 31.58 KG/M2

## 2024-05-02 DIAGNOSIS — R11.0 NAUSEA: ICD-10-CM

## 2024-05-02 DIAGNOSIS — Z12.11 SCREENING FOR COLON CANCER: ICD-10-CM

## 2024-05-02 DIAGNOSIS — K57.92 DIVERTICULITIS: Primary | ICD-10-CM

## 2024-05-02 PROCEDURE — 3079F DIAST BP 80-89 MM HG: CPT | Performed by: INTERNAL MEDICINE

## 2024-05-02 PROCEDURE — 3075F SYST BP GE 130 - 139MM HG: CPT | Performed by: INTERNAL MEDICINE

## 2024-05-02 PROCEDURE — 99203 OFFICE O/P NEW LOW 30 MIN: CPT | Performed by: INTERNAL MEDICINE

## 2024-05-02 RX ORDER — POLYETHYLENE GLYCOL 3350, SODIUM CHLORIDE, SODIUM BICARBONATE, POTASSIUM CHLORIDE 420; 11.2; 5.72; 1.48 G/4L; G/4L; G/4L; G/4L
4000 POWDER, FOR SOLUTION ORAL ONCE
Qty: 4000 ML | Refills: 0 | Status: SHIPPED | OUTPATIENT
Start: 2024-05-02 | End: 2024-05-02

## 2024-05-02 NOTE — PATIENT INSTRUCTIONS
Scheduled date of EGD/colonoscopy (as of today):07/23/24  Physician performing EGD/colonoscopy:Dr. Padilla  Location of EGD/colonoscopy:[facility]  Desired bowel prep reviewed with patient:Ananya  Instructions reviewed with patient by:yasmeen  Clearances:   n/a

## 2024-05-02 NOTE — PROGRESS NOTES
[facility] - Outpatient Consultation  Elieser Montoya 54 y.o. male MRN: [id number]  Encounter: [id number]        ASSESSMENT AND PLAN:       Diagnoses and all orders for this visit:    Diverticulitis  First episode of diverticulitis now resolved.  Feeling well.  We discussed this diagnosis in some detail.  Recommend high-fiber diet.  If symptoms return recommend liquid diet and contact this office.  Will plan further evaluation with colonoscopy    -     Colonoscopy; Future  -     polyethylene glycol-electrolytes (TriLyte) 4000 mL solution; Take 4,000 mL by mouth once for 1 dose Take 4000 mL by mouth once for 1 dose. Use as directed    Nausea  -     EGD; Future    Screening for colon cancer  Will evaluate at the time of colonoscopy      Other orders  -     Diet NPO; Sips with meds; Standing  -     Void on call to OR; Standing  -     Insert peripheral IV; Standing        ______________________________________________________________________    HPI: Patient recently seen in the ER with acute onset of left flank pain which persisted for several days.  CT revealed ascending diverticulitis which was treated with a course of oral antibiotics as an outpatient.  Currently feeling well and nearly back to normal.  No change in bowel habit, blood in stool, vomiting, fever or chills, unexplained weight loss.  He does occasionally feel nauseated which he attributes to vaping.  No significant heartburn or regurgitation.  No family history of gastrointestinal disease.  Works as a holt    REVIEW OF SYSTEMS:    Review of Systems   Constitutional: Negative for fever and weight loss.   Musculoskeletal:  Negative for myalgias.   Gastrointestinal:  Positive for abdominal pain. Negative for anorexia, constipation, diarrhea, flatus, hematochezia, melena, nausea and vomiting.   Genitourinary:  Negative for dysuria, frequency and hematuria.   Neurological:  Negative for headaches.    Answers submitted by the  "patient for this visit:  Abdominal Pain Questionnaire (Submitted on 4/26/2024)  Chief Complaint: Abdominal pain  Chronicity: new  Onset: more than 1 month ago  Onset quality: sudden  Frequency: rarely  Progression since onset: resolved  Pain location: left flank  Pain - numeric: 0/10  Pain quality: sharp  Radiates to: does not radiate  arthralgias: No  belching: No  Aggravated by: deep breathing  Relieved by: being still  Diagnostic workup: CT scan      Historical Information   Past Medical History:   Diagnosis Date    Diverticulitis of colon     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    Nicotine dependence 08/17/2009    Vertigo     last [id number]/09/13    Viral warts 08/27/2010     History reviewed. No pertinent surgical history.  Social History   Social History     Substance and Sexual Activity   Alcohol Use Yes    Comment: social      Social History     Substance and Sexual Activity   Drug Use Never     Social History     Tobacco Use   Smoking Status Former    Current packs/day: 0.00    Average packs/day: 1 pack/day for 35.3 years (35.3 ttl pk-yrs)    Types: Cigarettes    Start date: 1986    Quit date: 5/1/2021    Years since quitting: 3.0   Smokeless Tobacco Never     Family History   Problem Relation Age of Onset    No Known Problems Mother     Hypertension Father     Lymphoma Father        Meds/Allergies       Current Outpatient Medications:     amLODIPine (NORVASC) 5 mg tablet    polyethylene glycol-electrolytes (TriLyte) 4000 mL solution    dicyclomine (BENTYL) 20 mg tablet    No Known Allergies        Objective     Blood pressure 136/86, pulse 92, height 5' 4\" (1.626 m), weight 83.9 kg (185 lb). Body mass index is 31.76 kg/m².        PHYSICAL EXAM:      Physical Exam  Vitals and nursing note reviewed.   Constitutional:       General: He is not in acute distress.     Appearance: He is obese. He is not ill-appearing, toxic-appearing or diaphoretic.   HENT:      Head: Normocephalic and atraumatic.      Mouth/Throat:     "  Mouth: Mucous membranes are moist.   Eyes:      General: No scleral icterus.     Pupils: Pupils are equal, round, and reactive to light.   Cardiovascular:      Rate and Rhythm: Normal rate and regular rhythm.   Pulmonary:      Effort: Pulmonary effort is normal. No respiratory distress.   Abdominal:      General: There is no distension.      Palpations: Abdomen is soft.      Tenderness: There is no abdominal tenderness. There is no guarding or rebound.   Musculoskeletal:         General: Normal range of motion.      Cervical back: Normal range of motion and neck supple.      Right lower leg: No edema.      Left lower leg: No edema.   Skin:     General: Skin is warm and dry.   Neurological:      General: No focal deficit present.      Mental Status: He is alert and oriented to person, place, and time.   Psychiatric:         Mood and Affect: Mood normal.         Behavior: Behavior normal.              Lab Results:   No visits with results within 1 Day(s) from this visit.   Latest known visit with results is:   Admission on 03/15/2024, Discharged on 03/15/2024   Component Date Value    WBC 03/15/2024 11.67 (H)     RBC 03/15/2024 4.64     Hemoglobin 03/15/2024 13.8     Hematocrit 03/15/2024 41.1     MCV 03/15/2024 89     MCH 03/15/2024 29.7     MCHC 03/15/2024 33.6     RDW 03/15/2024 12.9     MPV 03/15/2024 9.4     Platelets 03/15/2024 233     nRBC 03/15/2024 0     Segmented % 03/15/2024 71     Immature Grans % 03/15/2024 0     Lymphocytes % 03/15/2024 15     Monocytes % 03/15/2024 11     Eosinophils Relative 03/15/2024 2     Basophils Relative 03/15/2024 1     Absolute Neutrophils 03/15/2024 8.34 (H)     Absolute Immature Grans 03/15/2024 0.05     Absolute Lymphocytes 03/15/2024 1.70     Absolute Monocytes 03/15/2024 1.31 (H)     Eosinophils Absolute 03/15/2024 0.21     Basophils Absolute 03/15/2024 0.06     Sodium 03/15/2024 136     Potassium 03/15/2024 4.2     Chloride 03/15/2024 103     CO2 03/15/2024 26     ANION  GAP 03/15/2024 7     BUN 03/15/2024 15     Creatinine 03/15/2024 0.86     Glucose 03/15/2024 102     Calcium 03/15/2024 9.4     AST 03/15/2024 19     ALT 03/15/2024 21     Alkaline Phosphatase 03/15/2024 44     Total Protein 03/15/2024 7.3     Albumin 03/15/2024 4.4     Total Bilirubin 03/15/2024 0.80     eGFR 03/15/2024 99     Magnesium 03/15/2024 1.8 (L)     Lipase 03/15/2024 18          Radiology Results:   No results found.

## 2024-05-19 DIAGNOSIS — I10 BENIGN ESSENTIAL HYPERTENSION: ICD-10-CM

## 2024-05-19 RX ORDER — AMLODIPINE BESYLATE 5 MG/1
5 TABLET ORAL DAILY
Qty: 90 TABLET | Refills: 1 | Status: SHIPPED | OUTPATIENT
Start: 2024-05-19

## 2024-07-16 ENCOUNTER — OFFICE VISIT (OUTPATIENT)
Dept: FAMILY MEDICINE CLINIC | Facility: CLINIC | Age: 54
End: 2024-07-16
Payer: COMMERCIAL

## 2024-07-16 ENCOUNTER — TELEPHONE (OUTPATIENT)
Dept: GASTROENTEROLOGY | Facility: CLINIC | Age: 54
End: 2024-07-16

## 2024-07-16 VITALS
SYSTOLIC BLOOD PRESSURE: 122 MMHG | HEART RATE: 93 BPM | DIASTOLIC BLOOD PRESSURE: 70 MMHG | WEIGHT: 179 LBS | RESPIRATION RATE: 16 BRPM | HEIGHT: 64 IN | OXYGEN SATURATION: 98 % | BODY MASS INDEX: 30.56 KG/M2 | TEMPERATURE: 98 F

## 2024-07-16 DIAGNOSIS — D35.02 ADENOMA OF LEFT ADRENAL GLAND: ICD-10-CM

## 2024-07-16 DIAGNOSIS — Z13.220 SCREENING FOR LIPID DISORDERS: ICD-10-CM

## 2024-07-16 DIAGNOSIS — Z00.00 HEALTHCARE MAINTENANCE: Primary | ICD-10-CM

## 2024-07-16 DIAGNOSIS — Z91.89 FRAMINGHAM CARDIAC RISK <10% IN NEXT 10 YEARS: ICD-10-CM

## 2024-07-16 DIAGNOSIS — Z12.5 PROSTATE CANCER SCREENING: ICD-10-CM

## 2024-07-16 DIAGNOSIS — Z13.1 SCREENING FOR DIABETES MELLITUS (DM): ICD-10-CM

## 2024-07-16 DIAGNOSIS — E66.9 OBESITY (BMI 30-39.9): ICD-10-CM

## 2024-07-16 DIAGNOSIS — M25.512 ACUTE PAIN OF LEFT SHOULDER: ICD-10-CM

## 2024-07-16 DIAGNOSIS — I10 BENIGN ESSENTIAL HYPERTENSION: ICD-10-CM

## 2024-07-16 DIAGNOSIS — M54.2 NECK DISCOMFORT: ICD-10-CM

## 2024-07-16 PROCEDURE — 99214 OFFICE O/P EST MOD 30 MIN: CPT | Performed by: FAMILY MEDICINE

## 2024-07-16 PROCEDURE — 99396 PREV VISIT EST AGE 40-64: CPT | Performed by: FAMILY MEDICINE

## 2024-07-16 RX ORDER — AMLODIPINE BESYLATE 5 MG/1
5 TABLET ORAL DAILY
Qty: 90 TABLET | Refills: 1 | Status: SHIPPED | OUTPATIENT
Start: 2024-07-16

## 2024-07-16 NOTE — PROGRESS NOTES
Assessment/Plan:    No problem-specific Assessment & Plan notes found for this encounter.    Cpe  Diet/exercise advised  Labs reviewed    Several issues, separate from prevention, were discussed and addressed today, and therefore coded separately from the Preventative Visit:    Neck discomfort  No mass on exam  Await upcoming EGD  Possible LPR  Might offer ENT uri if gi eval neg  US neck ordered    Left adrenal adenoma  Noted on CT 3/15/24  Pt not aware  Ordered labs  Consider endocrine next vs general surgeon    Left shoulder pain and upper thoracic spasm  Suggest PT    Htn stable  Continue meds     Diagnoses and all orders for this visit:    Healthcare maintenance    Adenoma of left adrenal gland  -     Aldosterone/Renin Ratio; Future  -     Cortisol Level, AM Specimen; Future  -     Aldosterone/Renin Ratio  -     Cortisol Level, AM Specimen    Screening for diabetes mellitus (DM)  -     Comprehensive metabolic panel; Future  -     Comprehensive metabolic panel    Screening for lipid disorders  -     Lipid Panel with Direct LDL reflex; Future  -     Lipid Panel with Direct LDL reflex    Prostate cancer screening  -     PSA Total (Reflex To Free); Future  -     PSA Total (Reflex To Free)    Neck discomfort  -     US head neck soft tissue; Future    Acute pain of left shoulder  -     Ambulatory referral to Physical Therapy; Future    Benign essential hypertension  -     amLODIPine (NORVASC) 5 mg tablet; Take 1 tablet (5 mg total) by mouth daily    BMI 30.0-30.9,adult    Obesity (BMI 30-39.9)    [place] cardiac risk <10% in next 10 years            Return in about 6 months (around 1/16/2025) for Recheck.    Subjective:      Patient ID: Elieser Montoya is a 54 y.o. male.    Chief Complaint   Patient presents with    Physical Exam     Rhonda Schwarz MA        HPI  Here for cpe  Eating w/o restrictions  Drinks water mostly  No exercise program  No ankle edema  No smoking    Pain left side of  "neck  Years  Tooth?  Dentist said neg  Also some more recent shoulder and upper chest pain  No triggers  Lasts days to weeks  Can be gone for weeks at a time  Frequent heartburn  EGD and cscope pending    The following portions of the patient's history were reviewed and updated as appropriate: allergies, current medications, past family history, past medical history, past social history, past surgical history and problem list.    Review of Systems   Constitutional:  Negative for chills and fever.   HENT:  Negative for trouble swallowing.          Current Outpatient Medications   Medication Sig Dispense Refill    amLODIPine (NORVASC) 5 mg tablet Take 1 tablet (5 mg total) by mouth daily 90 tablet 1     No current facility-administered medications for this visit.       Objective:    /70   Pulse 93   Temp 98 °F (36.7 °C)   Resp 16   Ht 5' 4\" (1.626 m)   Wt 81.2 kg (179 lb)   SpO2 98%   BMI 30.73 kg/m²        Physical Exam  Vitals and nursing note reviewed.   Constitutional:       General: He is not in acute distress.     Appearance: He is well-developed. He is obese. He is not ill-appearing.   HENT:      Head: Normocephalic.      Right Ear: Tympanic membrane and external ear normal.      Left Ear: Tympanic membrane and external ear normal.      Nose: Nose normal.      Mouth/Throat:      Mouth: Mucous membranes are moist.   Eyes:      General: No scleral icterus.     Conjunctiva/sclera: Conjunctivae normal.   Neck:      Vascular: No carotid bruit.   Cardiovascular:      Rate and Rhythm: Normal rate and regular rhythm.      Heart sounds: No murmur heard.  Pulmonary:      Effort: Pulmonary effort is normal. No respiratory distress.      Breath sounds: No wheezing.   Abdominal:      General: There is no distension.      Palpations: Abdomen is soft.      Tenderness: There is no abdominal tenderness.   Genitourinary:     Penis: Normal.       Testes: Normal.      Prostate: Normal.      Rectum: Normal. "   Musculoskeletal:         General: No deformity.      Cervical back: Neck supple. No rigidity or tenderness.      Right lower leg: No edema.      Left lower leg: No edema.   Lymphadenopathy:      Cervical: No cervical adenopathy.   Skin:     General: Skin is warm and dry.      Findings: No erythema or rash.   Neurological:      Mental Status: He is alert.      Motor: No weakness.      Gait: Gait normal.   Psychiatric:         Mood and Affect: Mood normal.         Behavior: Behavior normal.         Thought Content: Thought content normal.                Ward Duran DO

## 2024-07-18 ENCOUNTER — ANESTHESIA (OUTPATIENT)
Dept: ANESTHESIOLOGY | Facility: HOSPITAL | Age: 54
End: 2024-07-18

## 2024-07-18 ENCOUNTER — ANESTHESIA EVENT (OUTPATIENT)
Dept: ANESTHESIOLOGY | Facility: HOSPITAL | Age: 54
End: 2024-07-18

## 2024-07-22 RX ORDER — SODIUM CHLORIDE, SODIUM LACTATE, POTASSIUM CHLORIDE, CALCIUM CHLORIDE 600; 310; 30; 20 MG/100ML; MG/100ML; MG/100ML; MG/100ML
75 INJECTION, SOLUTION INTRAVENOUS CONTINUOUS
Status: CANCELLED | OUTPATIENT
Start: 2024-07-22

## 2024-07-23 ENCOUNTER — HOSPITAL ENCOUNTER (OUTPATIENT)
Dept: GASTROENTEROLOGY | Facility: AMBULARY SURGERY CENTER | Age: 54
Setting detail: OUTPATIENT SURGERY
Discharge: HOME | End: 2024-07-23
Attending: INTERNAL MEDICINE
Payer: COMMERCIAL

## 2024-07-23 ENCOUNTER — ANESTHESIA EVENT (OUTPATIENT)
Dept: GASTROENTEROLOGY | Facility: AMBULARY SURGERY CENTER | Age: 54
End: 2024-07-23

## 2024-07-23 ENCOUNTER — ANESTHESIA (OUTPATIENT)
Dept: GASTROENTEROLOGY | Facility: AMBULARY SURGERY CENTER | Age: 54
End: 2024-07-23

## 2024-07-23 VITALS
HEIGHT: 65 IN | BODY MASS INDEX: 29.99 KG/M2 | TEMPERATURE: 97.9 F | HEART RATE: 77 BPM | DIASTOLIC BLOOD PRESSURE: 81 MMHG | RESPIRATION RATE: 18 BRPM | SYSTOLIC BLOOD PRESSURE: 119 MMHG | WEIGHT: 180 LBS | OXYGEN SATURATION: 99 %

## 2024-07-23 DIAGNOSIS — R11.0 NAUSEA: ICD-10-CM

## 2024-07-23 DIAGNOSIS — K57.92 DIVERTICULITIS: ICD-10-CM

## 2024-07-23 PROBLEM — Z72.0 VAPES NICOTINE CONTAINING SUBSTANCE: Status: ACTIVE | Noted: 2024-07-23

## 2024-07-23 PROCEDURE — 88305 TISSUE EXAM BY PATHOLOGIST: CPT | Performed by: PATHOLOGY

## 2024-07-23 PROCEDURE — 45385 COLONOSCOPY W/LESION REMOVAL: CPT | Performed by: INTERNAL MEDICINE

## 2024-07-23 PROCEDURE — 43239 EGD BIOPSY SINGLE/MULTIPLE: CPT | Performed by: INTERNAL MEDICINE

## 2024-07-23 PROCEDURE — 88342 IMHCHEM/IMCYTCHM 1ST ANTB: CPT | Performed by: PATHOLOGY

## 2024-07-23 PROCEDURE — 45380 COLONOSCOPY AND BIOPSY: CPT | Performed by: INTERNAL MEDICINE

## 2024-07-23 RX ORDER — SODIUM CHLORIDE, SODIUM LACTATE, POTASSIUM CHLORIDE, CALCIUM CHLORIDE 600; 310; 30; 20 MG/100ML; MG/100ML; MG/100ML; MG/100ML
75 INJECTION, SOLUTION INTRAVENOUS CONTINUOUS
Status: DISCONTINUED | OUTPATIENT
Start: 2024-07-23 | End: 2024-07-27 | Stop reason: HOSPADM

## 2024-07-23 RX ORDER — PROPOFOL 10 MG/ML
INJECTION, EMULSION INTRAVENOUS AS NEEDED
Status: DISCONTINUED | OUTPATIENT
Start: 2024-07-23 | End: 2024-07-23

## 2024-07-23 RX ORDER — LIDOCAINE HYDROCHLORIDE 10 MG/ML
INJECTION, SOLUTION EPIDURAL; INFILTRATION; INTRACAUDAL; PERINEURAL AS NEEDED
Status: DISCONTINUED | OUTPATIENT
Start: 2024-07-23 | End: 2024-07-23

## 2024-07-23 RX ADMIN — PROPOFOL 120 MCG/KG/MIN: 10 INJECTION, EMULSION INTRAVENOUS at 13:30

## 2024-07-23 RX ADMIN — SODIUM CHLORIDE, SODIUM LACTATE, POTASSIUM CHLORIDE, AND CALCIUM CHLORIDE 75 ML/HR: .6; .31; .03; .02 INJECTION, SOLUTION INTRAVENOUS at 12:26

## 2024-07-23 RX ADMIN — PROPOFOL 40 MG: 10 INJECTION, EMULSION INTRAVENOUS at 13:33

## 2024-07-23 RX ADMIN — PROPOFOL 50 MG: 10 INJECTION, EMULSION INTRAVENOUS at 13:46

## 2024-07-23 RX ADMIN — PROPOFOL 150 MG: 10 INJECTION, EMULSION INTRAVENOUS at 13:29

## 2024-07-23 RX ADMIN — LIDOCAINE HYDROCHLORIDE 50 MG: 10 INJECTION, SOLUTION EPIDURAL; INFILTRATION; INTRACAUDAL; PERINEURAL at 13:29

## 2024-07-23 RX ADMIN — PROPOFOL 40 MG: 10 INJECTION, EMULSION INTRAVENOUS at 13:31

## 2024-07-23 NOTE — ANESTHESIA PREPROCEDURE EVALUATION
Procedure:  COLONOSCOPY  EGD    Relevant Problems   CARDIO   (+) Benign essential hypertension   (+) Hyperlipidemia LDL goal <130      Behavioral Health   (+) Vapes nicotine containing substance        Physical Exam    Airway    Mallampati score: II  TM Distance: >3 FB  Neck ROM: full     Dental       Cardiovascular  Rhythm: regular, Rate: normal    Pulmonary   Breath sounds clear to auscultation    Other Findings        Anesthesia Plan  ASA Score- 2     Anesthesia Type- IV sedation with anesthesia with ASA Monitors.         Additional Monitors:     Airway Plan:            Plan Factors-    Chart reviewed.        Patient is a current smoker.  Patient instructed to abstain from smoking on day of procedure. Patient smoked on day of surgery.            Induction- intravenous.    Postoperative Plan-     Perioperative Resuscitation Plan - Level 1 - Full Code.       Informed Consent- Anesthetic plan and risks discussed with patient.  I personally reviewed this patient with the CRNA. Discussed and agreed on the Anesthesia Plan with the CRNA..

## 2024-07-23 NOTE — H&P
"History and Physical -  [facility]  Elieser Montoya 54 y.o. male MRN: [id number]                  HPI: Elieser Montoya is a 54 y.o. year old male who presents for diverticulitis, nausea      REVIEW OF SYSTEMS: Per the HPI, and otherwise unremarkable.    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 No pertinent surgical history.  Social History   Social History     Substance and Sexual Activity   Alcohol Use Yes    Comment: social      Social History     Substance and Sexual Activity   Drug Use Never     Social History     Tobacco Use   Smoking Status Former    Current packs/day: 0.00    Average packs/day: 1 pack/day for 35.3 years (35.3 ttl pk-yrs)    Types: Cigarettes    Start date: 1986    Quit date: 5/1/2021    Years since quitting: 3.2   Smokeless Tobacco Never     Family History   Problem Relation Age of Onset    No Known Problems Mother     Hypertension Father     Lymphoma Father        Meds/Allergies       Current Outpatient Medications:     amLODIPine (NORVASC) 5 mg tablet    Current Facility-Administered Medications:     lactated ringers infusion, 75 mL/hr, Intravenous, Continuous, Continue from Pre-op at 07/23/24 1319    No Known Allergies    Objective     /81   Pulse 84   Temp 97.9 °F (36.6 °C) (Temporal)   Resp 19   Ht 5' 5\" (1.651 m)   Wt 81.6 kg (180 lb)   SpO2 99%   BMI 29.95 kg/m²       PHYSICAL EXAM    Gen: NAD  Head: NCAT  CV: RRR  CHEST: Clear  ABD: soft, NT/ND  EXT: no edema      ASSESSMENT/PLAN:  This is a 54 y.o. year old male here for colonoscopy, EGD, and he is stable and optimized for his procedure.        "

## 2024-07-23 NOTE — ANESTHESIA POSTPROCEDURE EVALUATION
Post-Op Assessment Note    CV Status:  Stable  Pain Score: 0    Pain management: adequate       Mental Status:  Awake   Hydration Status:  Stable   PONV Controlled:  None   Airway Patency:  Patent     Post Op Vitals Reviewed: Yes    No anethesia notable event occurred.    Staff: Anesthesiologist               BP      Temp      Pulse     Resp      SpO2

## 2024-07-26 PROCEDURE — 88342 IMHCHEM/IMCYTCHM 1ST ANTB: CPT | Performed by: PATHOLOGY

## 2024-07-26 PROCEDURE — 88305 TISSUE EXAM BY PATHOLOGIST: CPT | Performed by: PATHOLOGY

## 2024-08-02 ENCOUNTER — OFFICE VISIT (OUTPATIENT)
Dept: FAMILY MEDICINE CLINIC | Facility: CLINIC | Age: 54
End: 2024-08-02
Payer: COMMERCIAL

## 2024-08-02 VITALS
BODY MASS INDEX: 30.19 KG/M2 | TEMPERATURE: 97.3 F | HEART RATE: 84 BPM | HEIGHT: 65 IN | WEIGHT: 181.2 LBS | SYSTOLIC BLOOD PRESSURE: 128 MMHG | DIASTOLIC BLOOD PRESSURE: 80 MMHG | RESPIRATION RATE: 16 BRPM

## 2024-08-02 DIAGNOSIS — I10 BENIGN ESSENTIAL HYPERTENSION: ICD-10-CM

## 2024-08-02 DIAGNOSIS — S05.90XA EYE TRAUMA: Primary | ICD-10-CM

## 2024-08-02 PROCEDURE — 99213 OFFICE O/P EST LOW 20 MIN: CPT | Performed by: NURSE PRACTITIONER

## 2024-08-02 PROCEDURE — 3079F DIAST BP 80-89 MM HG: CPT | Performed by: NURSE PRACTITIONER

## 2024-08-02 PROCEDURE — 3074F SYST BP LT 130 MM HG: CPT | Performed by: NURSE PRACTITIONER

## 2024-08-02 NOTE — PROGRESS NOTES
"Ambulatory Visit  Name: Elieser Montoya      : 1970      MRN: [id number]  Encounter Provider: MELQUIADES Eli  Encounter Date: 2024   Encounter department: [facility]    Spoke with Dr. Prince's office locally, who recommends patient be evaluated by Dr. James Chavez.  Patient was provided with office contact info.  Advise ER for any worsening visual defects or loss of vision.    Assessment & Plan   1. Eye trauma  2. Benign essential hypertension  Assessment & Plan:  Stable     History of Present Illness     Patient here today with complaints of left eye pain.  He was hit in the eye with a softball 1 week ago.  Reports he had loss of vision for several minutes immediately after it happened which then returned to normal.  He had a lot of swelling around his eye and cheek.  For the past day, he has had increasing blurred vision, light sensitivity, and pain behind the eye with eye movement.        Review of Systems   Constitutional: Negative.    Eyes:  Positive for photophobia, pain, redness and visual disturbance.   Neurological:  Negative for headaches.     Current Outpatient Medications on File Prior to Visit   Medication Sig Dispense Refill   • amLODIPine (NORVASC) 5 mg tablet Take 1 tablet (5 mg total) by mouth daily 90 tablet 1     No current facility-administered medications on file prior to visit.      Social History     Tobacco Use   • Smoking status: Former     Current packs/day: 0.00     Average packs/day: 1 pack/day for 35.3 years (35.3 ttl pk-yrs)     Types: Cigarettes     Start date:      Quit date: 2021     Years since quitting: 3.2   • Smokeless tobacco: Never   Vaping Use   • Vaping status: Every Day   • Substances: Nicotine   Substance and Sexual Activity   • Alcohol use: Yes     Comment: social    • Drug use: Never   • Sexual activity: Not on file     Objective     /80   Pulse 84   Temp (!) 97.3 °F (36.3 °C)   Resp 16   Ht 5' 5\" (1.651 m)   Wt " 82.2 kg (181 lb 3.2 oz)   BMI 30.15 kg/m²     Physical Exam  Constitutional:       General: He is not in acute distress.     Appearance: Normal appearance. He is well-developed. He is not diaphoretic.   Eyes:      Extraocular Movements:      Left eye: Normal extraocular motion and no nystagmus.      Conjunctiva/sclera:      Left eye: Left conjunctiva is injected. No hemorrhage.     Pupils: Pupils are unequal.      Right eye: Pupil is reactive and not sluggish.      Left eye: Pupil is reactive and not sluggish.      Visual Fields: Right eye visual fields normal and left eye visual fields normal.   Pulmonary:      Effort: Pulmonary effort is normal. No respiratory distress.   Neurological:      Mental Status: He is alert.      Cranial Nerves: Cranial nerves 2-12 are intact.      Sensory: Sensation is intact.      Motor: Motor function is intact.      Coordination: Coordination is intact.   Psychiatric:         Mood and Affect: Mood normal.         Behavior: Behavior normal.       Administrative Statements

## 2024-08-16 ENCOUNTER — TELEPHONE (OUTPATIENT)
Dept: GASTROENTEROLOGY | Facility: CLINIC | Age: 54
End: 2024-08-16

## 2024-08-16 NOTE — TELEPHONE ENCOUNTER
----- Message from Castro Padilla MD sent at 8/13/2024  3:15 PM EDT -----  Please call the patient regarding his result.  Stomach biopsies show no evidence of H. pylori.  Colon polyps were benign.  Repeat colonoscopy in 3 years

## 2024-08-16 NOTE — TELEPHONE ENCOUNTER
Attempted to call pt  but couldn't leave a msg . Will try to call later to alaina an ov. Alpesh BOYKIN [facility] [place] [id number] [place]  Left detailed message regarding recommendations. Left Gi # to return call to schedule OV. Recal entered    Please reach out to pt to schedule OV

## 2024-08-16 NOTE — TELEPHONE ENCOUNTER
Sugey Tate  8/16/2024  9:36 AM EDT Back to Top      Left detailed message regarding recommendations. Left Gi # to return call to schedule OV. Recal entered     Please reach out to pt to schedule OV    Castro Padilla MD  8/13/2024  3:15 PM EDT       Please call the patient regarding his result.  Stomach biopsies show no evidence of H. pylori.  Colon polyps were benign.  Repeat colonoscopy in 3 years

## 2024-08-20 LAB
ALBUMIN SERPL-MCNC: 4.4 G/DL (ref 3.8–4.9)
ALDOST SERPL-MCNC: 5.9 NG/DL (ref 0–30)
ALDOST/RENIN PLAS-RTO: 5 {RATIO} (ref 0–30)
ALP SERPL-CCNC: 60 IU/L (ref 44–121)
ALT SERPL-CCNC: 20 IU/L (ref 0–44)
AST SERPL-CCNC: 18 IU/L (ref 0–40)
BILIRUB SERPL-MCNC: 0.4 MG/DL (ref 0–1.2)
BUN SERPL-MCNC: 13 MG/DL (ref 6–24)
BUN/CREAT SERPL: 15 (ref 9–20)
CALCIUM SERPL-MCNC: 9.6 MG/DL (ref 8.7–10.2)
CHLORIDE SERPL-SCNC: 102 MMOL/L (ref 96–106)
CHOLEST SERPL-MCNC: 231 MG/DL (ref 100–199)
CO2 SERPL-SCNC: 23 MMOL/L (ref 20–29)
CORTIS AM PEAK SERPL-MCNC: 14.5 UG/DL (ref 6.2–19.4)
CREAT SERPL-MCNC: 0.88 MG/DL (ref 0.76–1.27)
EGFR: 103 ML/MIN/1.73
GLOBULIN SER-MCNC: 2.4 G/DL (ref 1.5–4.5)
GLUCOSE SERPL-MCNC: 86 MG/DL (ref 70–99)
HDLC SERPL-MCNC: 46 MG/DL
LDLC SERPL CALC-MCNC: 161 MG/DL (ref 0–99)
LDLC/HDLC SERPL: 3.5 RATIO (ref 0–3.6)
MICRODELETION SYND BLD/T FISH: NORMAL
POTASSIUM SERPL-SCNC: 4.5 MMOL/L (ref 3.5–5.2)
PROT SERPL-MCNC: 6.8 G/DL (ref 6–8.5)
PSA SERPL-MCNC: 1.1 NG/ML (ref 0–4)
RENIN PLAS-CCNC: 1.18 NG/ML/HR (ref 0.17–5.38)
SL AMB REFLEX CRITERIA: NORMAL
SL AMB VLDL CHOLESTEROL CALC: 24 MG/DL (ref 5–40)
SODIUM SERPL-SCNC: 139 MMOL/L (ref 134–144)
TRIGL SERPL-MCNC: 130 MG/DL (ref 0–149)

## 2024-12-26 ENCOUNTER — TELEPHONE (OUTPATIENT)
Age: 54
End: 2024-12-26

## 2024-12-26 NOTE — TELEPHONE ENCOUNTER
I spoke with Elieser's wife and told her he can go to [facility] today or I can schedule an appt for him tomorrow. She states that he just stepped out but she will call him now since he said he is feeling better today. She will call back if he would like for us to schedule an appt for him tomorrow. NO further action is required at this time Humberto

## 2024-12-26 NOTE — TELEPHONE ENCOUNTER
Wife called, patient has extremely sore throat, cough and generally unwell.  Looking to be seen today, however could find no openings with provider or any other providers.  Please advise how to proceed.

## 2024-12-27 ENCOUNTER — OFFICE VISIT (OUTPATIENT)
Dept: FAMILY MEDICINE CLINIC | Facility: CLINIC | Age: 54
End: 2024-12-27
Payer: COMMERCIAL

## 2024-12-27 VITALS
RESPIRATION RATE: 18 BRPM | SYSTOLIC BLOOD PRESSURE: 118 MMHG | DIASTOLIC BLOOD PRESSURE: 62 MMHG | HEIGHT: 65 IN | BODY MASS INDEX: 30.15 KG/M2 | TEMPERATURE: 98.7 F | HEART RATE: 84 BPM

## 2024-12-27 DIAGNOSIS — J01.00 ACUTE NON-RECURRENT MAXILLARY SINUSITIS: Primary | ICD-10-CM

## 2024-12-27 PROCEDURE — 99213 OFFICE O/P EST LOW 20 MIN: CPT | Performed by: NURSE PRACTITIONER

## 2024-12-27 RX ORDER — LATANOPROST 50 UG/ML
SOLUTION/ DROPS OPHTHALMIC
COMMUNITY
Start: 2024-11-16

## 2024-12-27 RX ORDER — AZITHROMYCIN 250 MG/1
TABLET, FILM COATED ORAL
Qty: 6 TABLET | Refills: 0 | Status: SHIPPED | OUTPATIENT
Start: 2024-12-27 | End: 2025-01-01

## 2024-12-27 NOTE — PROGRESS NOTES
"Name: Elieser Montoya      : 1970      MRN: [id number]  Encounter Provider: MELQUIADES Eli  Encounter Date: 2024   Encounter department: [facility]  :  Assessment & Plan  Acute non-recurrent maxillary sinusitis  Symptoms overall improving with symptomatic treatment, advised to start antibiotics if symptoms worsen over the next 48 to 72 hours.  Orders:  •  azithromycin (ZITHROMAX) 250 mg tablet; 2 tabs PO day 1, then 1 tab PO days 2-5           History of Present Illness     Today with complaints of sinus congestion, postnasal drip, sore throat, and headaches.  Symptoms have been ongoing for the past several days.  He is taking Mucinex D, which seems to be helping.  No associated fevers or breathing difficulties.      Review of Systems   Constitutional:  Negative for chills, fatigue and fever.   HENT:  Positive for congestion, postnasal drip and sore throat. Negative for ear pain, rhinorrhea and sinus pressure.    Respiratory:  Negative for cough, shortness of breath and wheezing.    Cardiovascular:  Negative for chest pain.   Gastrointestinal:  Negative for abdominal pain, diarrhea, nausea and vomiting.   Musculoskeletal:  Negative for arthralgias.   Skin:  Negative for rash.   Neurological:  Positive for headaches.       Objective   /62   Pulse 84   Temp 98.7 °F (37.1 °C)   Resp 18   Ht 5' 5\" (1.651 m)   BMI 30.15 kg/m²      Physical Exam  Vitals and nursing note reviewed.   Constitutional:       General: He is not in acute distress.     Appearance: Normal appearance.   HENT:      Head: Normocephalic and atraumatic.      Right Ear: Tympanic membrane and ear canal normal.      Left Ear: Tympanic membrane and ear canal normal.      Nose:      Right Turbinates: Not swollen.      Left Turbinates: Swollen.      Left Sinus: Maxillary sinus tenderness present.      Mouth/Throat:      Pharynx: Oropharynx is clear.   Eyes:      Conjunctiva/sclera: Conjunctivae normal. "   Neck:      Vascular: No carotid bruit.   Cardiovascular:      Rate and Rhythm: Normal rate and regular rhythm.      Pulses: Normal pulses.      Heart sounds: Normal heart sounds. No murmur heard.  Pulmonary:      Effort: Pulmonary effort is normal.      Breath sounds: Normal breath sounds.   Lymphadenopathy:      Cervical: No cervical adenopathy.   Skin:     General: Skin is warm and dry.   Neurological:      Mental Status: He is alert.   Psychiatric:         Mood and Affect: Mood normal.         Behavior: Behavior normal.

## 2025-01-21 ENCOUNTER — OFFICE VISIT (OUTPATIENT)
Dept: FAMILY MEDICINE CLINIC | Facility: CLINIC | Age: 55
End: 2025-01-21
Payer: COMMERCIAL

## 2025-01-21 VITALS
RESPIRATION RATE: 16 BRPM | TEMPERATURE: 98 F | DIASTOLIC BLOOD PRESSURE: 84 MMHG | SYSTOLIC BLOOD PRESSURE: 132 MMHG | HEART RATE: 98 BPM | HEIGHT: 65 IN | BODY MASS INDEX: 30.49 KG/M2 | WEIGHT: 183 LBS

## 2025-01-21 DIAGNOSIS — Z13.6 SCREENING FOR CARDIOVASCULAR, RESPIRATORY, AND GENITOURINARY DISEASES: ICD-10-CM

## 2025-01-21 DIAGNOSIS — Z13.1 SCREENING FOR DIABETES MELLITUS (DM): ICD-10-CM

## 2025-01-21 DIAGNOSIS — Z12.5 PROSTATE CANCER SCREENING: Primary | ICD-10-CM

## 2025-01-21 DIAGNOSIS — I10 BENIGN ESSENTIAL HYPERTENSION: ICD-10-CM

## 2025-01-21 DIAGNOSIS — D35.02 ADENOMA OF LEFT ADRENAL GLAND: ICD-10-CM

## 2025-01-21 DIAGNOSIS — Z13.83 SCREENING FOR CARDIOVASCULAR, RESPIRATORY, AND GENITOURINARY DISEASES: ICD-10-CM

## 2025-01-21 DIAGNOSIS — Z13.89 SCREENING FOR CARDIOVASCULAR, RESPIRATORY, AND GENITOURINARY DISEASES: ICD-10-CM

## 2025-01-21 DIAGNOSIS — E66.9 OBESITY (BMI 30-39.9): ICD-10-CM

## 2025-01-21 PROCEDURE — 99214 OFFICE O/P EST MOD 30 MIN: CPT | Performed by: FAMILY MEDICINE

## 2025-01-21 RX ORDER — AMLODIPINE BESYLATE 5 MG/1
5 TABLET ORAL DAILY
Qty: 100 TABLET | Refills: 1 | Status: SHIPPED | OUTPATIENT
Start: 2025-01-21

## 2025-01-21 NOTE — PROGRESS NOTES
"Name: Elieser Montoya      : 1970      MRN: [id number]  Encounter Provider: Ward Duran DO  Encounter Date: 2025   Encounter department: [facility]  :  Assessment & Plan  Benign essential hypertension  Stable on meds  F/u q6m  Orders:    amLODIPine (NORVASC) 5 mg tablet; Take 1 tablet (5 mg total) by mouth daily    Prostate cancer screening  yearly  Orders:    PSA Total (Reflex To Free); Future    Screening for cardiovascular, respiratory, and genitourinary diseases    Orders:    Lipid Panel with Direct LDL reflex; Future    Screening for diabetes mellitus (DM)    Orders:    Comprehensive metabolic panel; Future    Adenoma of left adrenal gland  CT f/u 12m as suggested was ordered  Orders:    CT abdomen wo contrast; Future    BMI 30.0-30.9,adult  Advised of goals  BMI likely skewed by muscle mass.         Obesity (BMI 30-39.9)                  History of Present Illness   HPI  Taking meds  Not exercising  [employer] work  Physical job though no exercise program    Review of Systems   Respiratory:  Negative for shortness of breath.    Cardiovascular:  Negative for chest pain and leg swelling.     Current Outpatient Medications   Medication Instructions    amLODIPine (NORVASC) 5 mg, Oral, Daily    latanoprost (XALATAN) 0.005 % ophthalmic solution          Objective   /84   Pulse 98   Temp 98 °F (36.7 °C)   Resp 16   Ht 5' 5\" (1.651 m)   Wt 83 kg (183 lb)   BMI 30.45 kg/m²      Physical Exam  Vitals and nursing note reviewed.   Constitutional:       General: He is not in acute distress.     Appearance: He is well-developed. He is obese. He is not ill-appearing.   HENT:      Head: Normocephalic.      Right Ear: Tympanic membrane normal.      Left Ear: Tympanic membrane normal.   Eyes:      General: No scleral icterus.     Conjunctiva/sclera: Conjunctivae normal.   Cardiovascular:      Rate and Rhythm: Normal rate and regular rhythm.   Pulmonary:      Effort: Pulmonary effort " is normal. No respiratory distress.      Breath sounds: No wheezing.   Abdominal:      Palpations: Abdomen is soft.      Tenderness: There is no abdominal tenderness.   Musculoskeletal:         General: No deformity.      Cervical back: Neck supple.      Right lower leg: No edema.      Left lower leg: No edema.   Skin:     General: Skin is warm and dry.      Coloration: Skin is not jaundiced or pale.   Neurological:      Mental Status: He is alert.      Motor: No weakness.      Gait: Gait normal.   Psychiatric:         Behavior: Behavior normal.         Thought Content: Thought content normal.

## 2025-01-22 NOTE — ASSESSMENT & PLAN NOTE
Stable on meds  F/u q6m  Orders:    amLODIPine (NORVASC) 5 mg tablet; Take 1 tablet (5 mg total) by mouth daily

## 2025-06-16 ENCOUNTER — OFFICE VISIT (OUTPATIENT)
Dept: URGENT CARE | Facility: CLINIC | Age: 55
End: 2025-06-16
Payer: COMMERCIAL

## 2025-06-16 VITALS
TEMPERATURE: 97.9 F | SYSTOLIC BLOOD PRESSURE: 134 MMHG | RESPIRATION RATE: 14 BRPM | HEART RATE: 100 BPM | BODY MASS INDEX: 30.79 KG/M2 | DIASTOLIC BLOOD PRESSURE: 88 MMHG | WEIGHT: 185 LBS | OXYGEN SATURATION: 99 %

## 2025-06-16 DIAGNOSIS — L23.7 POISON IVY: Primary | ICD-10-CM

## 2025-06-16 PROCEDURE — 99213 OFFICE O/P EST LOW 20 MIN: CPT | Performed by: PHYSICIAN ASSISTANT

## 2025-06-16 RX ORDER — PREDNISONE 10 MG/1
TABLET ORAL
Qty: 28 TABLET | Refills: 0 | Status: SHIPPED | OUTPATIENT
Start: 2025-06-16 | End: 2025-06-28

## 2025-06-16 RX ORDER — BETAMETHASONE DIPROPIONATE 0.5 MG/G
OINTMENT, AUGMENTED TOPICAL 2 TIMES DAILY
Qty: 30 G | Refills: 0 | Status: SHIPPED | OUTPATIENT
Start: 2025-06-16

## 2025-06-16 NOTE — PROGRESS NOTES
[facility]        NAME: Elieser Montoya is a 55 y.o. male  : 1970    MRN: [id number]  DATE: 2025  TIME: 6:11 PM    Assessment and Plan   Poison ivy [L23.7]  1. Poison ivy  predniSONE 10 mg tablet    betamethasone, augmented, (DIPROLENE) 0.05 % ointment        Discussed with the patient that I will treat him with prednisone for 12 days to prevent rebound dermatitis.  Discussed that he may not stop that medication in the middle.  Must finish the course and take the full dose.  I did also prescribe a steroid ointment to be used for the same amount of time.    Patient Instructions     Patient Instructions   Patient Education     Poison Yvette, Poison Strang, Poison Sumac Discharge Instructions   About this topic   Poison ivy, oak, and sumac are plants that may cause rashes on the skin. The plants can be found anywhere, including the woods or your yard. The rash is caused by the oily sap of the plants. It does not smell and it is clear so you probably can't tell that it is there. Sometimes, you get a rash by touching the plants. Other times, it is from touching something else, like clothes, pets, another person, or gardening tools that have touched a plant. Smoke from burning these plants can also cause a rash.  Not everyone who comes in contact with the sap will get a reaction, but most people will. A reaction may happen a few hours after you touch the sap or it may happen up to 5 days later. If the sap gets on your skin, it may become red, swollen, and very itchy. Blisters may also form. You can't catch a rash from someone else, but you can get it from someone if they transfer the sap to you. The rash may last 1 to 3 weeks.  What care is needed at home?   Ask your doctor what you need to do when you go home. Make sure you ask questions if you do not understand what the doctor says. This way you will know what you need to do.  If you have a rash:  Do not scratch or rub your skin. This can lead to  infection or spread the rash.  Try putting a cool, wet cloth on your rash.  Creams and lotions, like hydrocortisone cream and Calamine lotion, may help itching and blistering.  Take a cool shower to help ease the itching.  Take a bath using lukewarm water. Hot water can make itching worse. Adding oatmeal bath products or baking soda may also help.  After bathing or showering, blot your body dry rather than rubbing to avoid spread of the rash.  What follow-up care is needed?   Your doctor may ask you to make visits to the office to check on your progress. Be sure to keep these visits.  What drugs may be needed?   If you have a very bad rash, your doctor may give you drugs to help with swelling and itching.  What can be done to prevent this health problem?   Learn what the plants look like and stay away from them. Poison ivy and poison oak have three leaves on one stem. Poison sumac has 7 to 13 leaves that grow in pairs.  Wear long pants, long sleeves, and gloves if you must be around these plants.  Wash your hands with soap and water within 15 minutes if you have contact with the plants.  Scrub your fingernails carefully to prevent spreading to other parts of your body.  Take a shower if the plants have touched your arms, legs, or other body parts. Wash well with soap and water.  Wash clothing and shoes with soap and hot water. Wash anything else, like gardening tools that may have touched a plant.  Wash your pets to remove oil from the fur. Pets do not get this rash but you may get it from touching oil on their fur.  Do not burn these plants. This will spread the oil into the air. It can cause very bad problems with other people if the wind is blowing.  If you are prone to getting a reaction from these plants, you may want to consider using over-the-counter products that can help block the oil from getting into the skin.  When do I need to call the doctor?   Signs of infection. These include a fever of 100.4°F  (38°C) or higher, chills, wound that will not heal.  Trouble breathing or swallowing  Swelling of the face and lips or swelling that makes your eyes puffy or partially shut  A rash that covers a large part of your body, or that is spreading quickly  Pain, swelling, warmth, pus around the rash  A rash in your eyes, mouth, or genital area  Very bad itching that doesn't get better or keeps you awake at night  You are not feeling better in 2 to 3 days or you are feeling worse  Teach Back: Helping You Understand   The Teach Back Method helps you understand the information we are giving you. After you talk with the staff, tell them in your own words what you learned. This helps to make sure the staff has described each thing clearly. It also helps to explain things that may have been confusing. Before going home, make sure you can do these:  I can tell you about my condition.  I can tell you how to care for my rash.  I can tell you how I will take extra care to prevent this from happening in the future.  I can tell you what I will do if I have trouble breathing or swallowing, or if the rash covers a large part of my face or my body.  Last Reviewed Date   2021-11-15  Consumer Information Use and Disclaimer   This generalized information is a limited summary of diagnosis, treatment, and/or medication information. It is not meant to be comprehensive and should be used as a tool to help the user understand and/or assess potential diagnostic and treatment options. It does NOT include all information about conditions, treatments, medications, side effects, or risks that may apply to a specific patient. It is not intended to be medical advice or a substitute for the medical advice, diagnosis, or treatment of a health care provider based on the health care provider's examination and assessment of a patient’s specific and unique circumstances. Patients must speak with a health care provider for complete information about their  health, medical questions, and treatment options, including any risks or benefits regarding use of medications. This information does not endorse any treatments or medications as safe, effective, or approved for treating a specific patient. [employer] and its affiliates disclaim any warranty or liability relating to this information or the use thereof. The use of this information is governed by the Terms of Use, available at https://www.[employer].com/en/know/clinical-effectiveness-terms   Copyright   Copyright © 2024 [employer], [employer] and its affiliates and/or licensors. All rights reserved.        Follow up with PCP in 3-5 days.  Proceed to  ER if symptoms worsen.    Chief Complaint     Chief Complaint   Patient presents with    Rash     Pt presents with rash on arms bilateral, lest lower back area; started Thursday post weeding          History of Present Illness       The patient is a 55-year-old male presenting today for a rash.  He reports that he came in contact with poison ivy and the next day started with a rash.  Rash has been present for the last 4 days. Th rash is on his arms bilaterally, low back on the left side and left leg.  No prior episodes.  Denies history of diabetes.        Review of Systems   Review of Systems   Constitutional:  Negative for activity change, appetite change, chills, diaphoresis and fever.   HENT:  Negative for congestion, ear pain, rhinorrhea and sore throat.    Eyes:  Negative for pain and visual disturbance.   Respiratory:  Negative for cough, chest tightness and shortness of breath.    Cardiovascular:  Negative for chest pain and palpitations.   Gastrointestinal:  Negative for abdominal pain, diarrhea, nausea and vomiting.   Genitourinary:  Negative for dysuria and hematuria.   Musculoskeletal:  Negative for arthralgias, back pain and myalgias.   Skin:  Positive for rash. Negative for color change and pallor.   Neurological:  Negative for seizures, syncope and headaches.    All other systems reviewed and are negative.        Current Medications     Current Medications[1]    Current Allergies     Allergies as of 06/16/2025    (No Known Allergies)            The following portions of the patient's history were reviewed and updated as appropriate: allergies, current medications, past family history, past medical history, past social history, past surgical history and problem list.     Past Medical History[2]    Past Surgical History[3]    Family History[4]      Medications have been verified.        Objective   /88   Pulse 100   Temp 97.9 °F (36.6 °C)   Resp 14   Wt 83.9 kg (185 lb)   SpO2 99%   BMI 30.79 kg/m²        Physical Exam     Physical Exam  Vitals and nursing note reviewed.   Constitutional:       General: He is not in acute distress.     Appearance: Normal appearance. He is normal weight. He is not ill-appearing, toxic-appearing or diaphoretic.     Cardiovascular:      Rate and Rhythm: Normal rate and regular rhythm.      Heart sounds: No murmur heard.     No friction rub. No gallop.   Pulmonary:      Effort: Pulmonary effort is normal. No respiratory distress.      Breath sounds: Normal breath sounds. No stridor. No wheezing or rhonchi.     Skin:     Findings: Rash present.      Comments: Linear red rash on the patient's bilateral arms.  The patient has a patch on his low back on the left side of erythema with papules throughout.  Some excoriations noted.     Neurological:      Mental Status: He is alert.                        [1]   Current Outpatient Medications:     amLODIPine (NORVASC) 5 mg tablet, Take 1 tablet (5 mg total) by mouth daily, Disp: 100 tablet, Rfl: 1    betamethasone, augmented, (DIPROLENE) 0.05 % ointment, Apply topically 2 (two) times a day, Disp: 30 g, Rfl: 0    predniSONE 10 mg tablet, Take 4 tablets (40 mg total) by mouth daily for 4 days, THEN 2 tablets (20 mg total) daily for 4 days, THEN 1 tablet (10 mg total) daily for 4 days., Disp:  28 tablet, Rfl: 0    latanoprost (XALATAN) 0.005 % ophthalmic solution, , Disp: , Rfl:   [2]   Past Medical History:  Diagnosis Date    Diverticulitis of colon     Hypertension     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  [4]   Family History  Problem Relation Name Age of Onset    No Known Problems Mother      Hypertension Father      Lymphoma Father

## 2025-06-16 NOTE — PATIENT INSTRUCTIONS
Patient Education     Poison [place]vette, Poison [place], Poison [place] Discharge Instructions   About this topic   Poison ivy, oak, and sumac are plants that may cause rashes on the skin. The plants can be found anywhere, including the woods or your yard. The rash is caused by the oily sap of the plants. It does not smell and it is clear so you probably can't tell that it is there. Sometimes, you get a rash by touching the plants. Other times, it is from touching something else, like clothes, pets, another person, or gardening tools that have touched a plant. Smoke from burning these plants can also cause a rash.  Not everyone who comes in contact with the sap will get a reaction, but most people will. A reaction may happen a few hours after you touch the sap or it may happen up to 5 days later. If the sap gets on your skin, it may become red, swollen, and very itchy. Blisters may also form. You can't catch a rash from someone else, but you can get it from someone if they transfer the sap to you. The rash may last 1 to 3 weeks.  What care is needed at home?   Ask your doctor what you need to do when you go home. Make sure you ask questions if you do not understand what the doctor says. This way you will know what you need to do.  If you have a rash:  Do not scratch or rub your skin. This can lead to infection or spread the rash.  Try putting a cool, wet cloth on your rash.  Creams and lotions, like hydrocortisone cream and Calamine lotion, may help itching and blistering.  Take a cool shower to help ease the itching.  Take a bath using lukewarm water. Hot water can make itching worse. Adding oatmeal bath products or baking soda may also help.  After bathing or showering, blot your body dry rather than rubbing to avoid spread of the rash.  What follow-up care is needed?   Your doctor may ask you to make visits to the office to check on your progress. Be sure to keep these visits.  What drugs may be needed?   If you have a very  bad rash, your doctor may give you drugs to help with swelling and itching.  What can be done to prevent this health problem?   Learn what the plants look like and stay away from them. Poison ivy and poison oak have three leaves on one stem. Poison sumac has 7 to 13 leaves that grow in pairs.  Wear long pants, long sleeves, and gloves if you must be around these plants.  Wash your hands with soap and water within 15 minutes if you have contact with the plants.  Scrub your fingernails carefully to prevent spreading to other parts of your body.  Take a shower if the plants have touched your arms, legs, or other body parts. Wash well with soap and water.  Wash clothing and shoes with soap and hot water. Wash anything else, like gardening tools that may have touched a plant.  Wash your pets to remove oil from the fur. Pets do not get this rash but you may get it from touching oil on their fur.  Do not burn these plants. This will spread the oil into the air. It can cause very bad problems with other people if the wind is blowing.  If you are prone to getting a reaction from these plants, you may want to consider using over-the-counter products that can help block the oil from getting into the skin.  When do I need to call the doctor?   Signs of infection. These include a fever of 100.4°F (38°C) or higher, chills, wound that will not heal.  Trouble breathing or swallowing  Swelling of the face and lips or swelling that makes your eyes puffy or partially shut  A rash that covers a large part of your body, or that is spreading quickly  Pain, swelling, warmth, pus around the rash  A rash in your eyes, mouth, or genital area  Very bad itching that doesn't get better or keeps you awake at night  You are not feeling better in 2 to 3 days or you are feeling worse  Teach Back: Helping You Understand   The Teach Back Method helps you understand the information we are giving you. After you talk with the staff, tell them in your  own words what you learned. This helps to make sure the staff has described each thing clearly. It also helps to explain things that may have been confusing. Before going home, make sure you can do these:  I can tell you about my condition.  I can tell you how to care for my rash.  I can tell you how I will take extra care to prevent this from happening in the future.  I can tell you what I will do if I have trouble breathing or swallowing, or if the rash covers a large part of my face or my body.  Last Reviewed Date   2021-11-15  Consumer Information Use and Disclaimer   This generalized information is a limited summary of diagnosis, treatment, and/or medication information. It is not meant to be comprehensive and should be used as a tool to help the user understand and/or assess potential diagnostic and treatment options. It does NOT include all information about conditions, treatments, medications, side effects, or risks that may apply to a specific patient. It is not intended to be medical advice or a substitute for the medical advice, diagnosis, or treatment of a health care provider based on the health care provider's examination and assessment of a patient’s specific and unique circumstances. Patients must speak with a health care provider for complete information about their health, medical questions, and treatment options, including any risks or benefits regarding use of medications. This information does not endorse any treatments or medications as safe, effective, or approved for treating a specific patient. [employer] and its affiliates disclaim any warranty or liability relating to this information or the use thereof. The use of this information is governed by the Terms of Use, available at https://www.woltersPredictifyuwer.com/en/know/clinical-effectiveness-terms   Copyright   Copyright © 2024 [employer] and its affiliates and/or licensors. All rights reserved.

## 2025-08-04 DIAGNOSIS — I10 BENIGN ESSENTIAL HYPERTENSION: ICD-10-CM

## 2025-08-06 RX ORDER — AMLODIPINE BESYLATE 5 MG/1
5 TABLET ORAL DAILY
Qty: 90 TABLET | Refills: 1 | Status: SHIPPED | OUTPATIENT
Start: 2025-08-06